# Patient Record
Sex: FEMALE | Race: WHITE | Employment: OTHER | URBAN - METROPOLITAN AREA
[De-identification: names, ages, dates, MRNs, and addresses within clinical notes are randomized per-mention and may not be internally consistent; named-entity substitution may affect disease eponyms.]

---

## 2018-03-05 PROBLEM — M51.379 DEGENERATION OF LUMBAR OR LUMBOSACRAL INTERVERTEBRAL DISC: Chronic | Status: ACTIVE | Noted: 2018-03-05

## 2018-03-05 PROBLEM — M47.817 LUMBOSACRAL SPONDYLOSIS WITHOUT MYELOPATHY: Status: ACTIVE | Noted: 2018-03-05

## 2018-03-05 PROBLEM — M51.26 DISPLACEMENT OF LUMBAR INTERVERTEBRAL DISC WITHOUT MYELOPATHY: Chronic | Status: ACTIVE | Noted: 2018-03-05

## 2018-03-05 PROBLEM — M51.26 DISPLACEMENT OF LUMBAR INTERVERTEBRAL DISC WITHOUT MYELOPATHY: Status: ACTIVE | Noted: 2018-03-05

## 2018-03-05 PROBLEM — M51.37 DEGENERATION OF LUMBAR OR LUMBOSACRAL INTERVERTEBRAL DISC: Status: ACTIVE | Noted: 2018-03-05

## 2018-03-05 PROBLEM — M47.817 LUMBOSACRAL SPONDYLOSIS WITHOUT MYELOPATHY: Chronic | Status: ACTIVE | Noted: 2018-03-05

## 2018-03-05 PROBLEM — M51.379 DEGENERATION OF LUMBAR OR LUMBOSACRAL INTERVERTEBRAL DISC: Status: ACTIVE | Noted: 2018-03-05

## 2018-03-05 PROBLEM — M51.37 DEGENERATION OF LUMBAR OR LUMBOSACRAL INTERVERTEBRAL DISC: Chronic | Status: ACTIVE | Noted: 2018-03-05

## 2018-03-05 PROBLEM — M48.061 SPINAL STENOSIS, LUMBAR REGION, WITHOUT NEUROGENIC CLAUDICATION: Status: ACTIVE | Noted: 2018-03-05

## 2018-03-05 PROBLEM — M48.061 SPINAL STENOSIS, LUMBAR REGION, WITHOUT NEUROGENIC CLAUDICATION: Chronic | Status: ACTIVE | Noted: 2018-03-05

## 2018-05-24 PROBLEM — M48.061 SPINAL STENOSIS OF LUMBAR REGION: Status: ACTIVE | Noted: 2018-05-24

## 2018-05-24 PROBLEM — M51.26 DISC DISPLACEMENT, LUMBAR: Status: ACTIVE | Noted: 2018-05-24

## 2018-05-24 PROBLEM — M47.816 LUMBAR FACET ARTHROPATHY: Status: ACTIVE | Noted: 2018-05-24

## 2020-02-14 ENCOUNTER — HOSPITAL ENCOUNTER (OUTPATIENT)
Dept: CARDIAC REHAB | Age: 80
Setting detail: THERAPIES SERIES
Discharge: HOME | End: 2020-02-14
Payer: MEDICARE

## 2020-02-28 ENCOUNTER — HOSPITAL ENCOUNTER (OUTPATIENT)
Dept: CARDIAC REHAB | Age: 80
Setting detail: THERAPIES SERIES
Discharge: HOME | End: 2020-02-28
Payer: MEDICARE

## 2020-02-28 PROCEDURE — G0239 OTH RESP PROC, GROUP: HCPCS

## 2020-03-02 ENCOUNTER — HOSPITAL ENCOUNTER (OUTPATIENT)
Dept: CARDIAC REHAB | Age: 80
Setting detail: THERAPIES SERIES
Discharge: HOME | End: 2020-03-02
Payer: MEDICARE

## 2020-03-02 PROCEDURE — G0239 OTH RESP PROC, GROUP: HCPCS

## 2020-03-04 ENCOUNTER — HOSPITAL ENCOUNTER (OUTPATIENT)
Dept: CARDIAC REHAB | Age: 80
Setting detail: THERAPIES SERIES
Discharge: HOME | End: 2020-03-04
Payer: MEDICARE

## 2020-03-04 PROCEDURE — G0239 OTH RESP PROC, GROUP: HCPCS

## 2020-03-09 ENCOUNTER — HOSPITAL ENCOUNTER (OUTPATIENT)
Dept: CARDIAC REHAB | Age: 80
Setting detail: THERAPIES SERIES
Discharge: HOME | End: 2020-03-09
Payer: MEDICARE

## 2020-03-09 PROCEDURE — G0239 OTH RESP PROC, GROUP: HCPCS

## 2020-03-11 ENCOUNTER — HOSPITAL ENCOUNTER (OUTPATIENT)
Dept: CARDIAC REHAB | Age: 80
Setting detail: THERAPIES SERIES
Discharge: HOME | End: 2020-03-11
Payer: MEDICARE

## 2020-03-11 PROCEDURE — G0239 OTH RESP PROC, GROUP: HCPCS

## 2020-03-13 ENCOUNTER — HOSPITAL ENCOUNTER (OUTPATIENT)
Dept: CARDIAC REHAB | Age: 80
Setting detail: THERAPIES SERIES
Discharge: HOME | End: 2020-03-13
Payer: MEDICARE

## 2020-03-13 PROCEDURE — G0239 OTH RESP PROC, GROUP: HCPCS

## 2020-03-16 ENCOUNTER — APPOINTMENT (OUTPATIENT)
Dept: CARDIAC REHAB | Age: 80
End: 2020-03-16
Payer: MEDICARE

## 2020-03-18 ENCOUNTER — APPOINTMENT (OUTPATIENT)
Dept: CARDIAC REHAB | Age: 80
End: 2020-03-18
Payer: MEDICARE

## 2020-03-20 ENCOUNTER — APPOINTMENT (OUTPATIENT)
Dept: CARDIAC REHAB | Age: 80
End: 2020-03-20
Payer: MEDICARE

## 2020-03-23 ENCOUNTER — APPOINTMENT (OUTPATIENT)
Dept: CARDIAC REHAB | Age: 80
End: 2020-03-23
Payer: MEDICARE

## 2020-03-25 ENCOUNTER — APPOINTMENT (OUTPATIENT)
Dept: CARDIAC REHAB | Age: 80
End: 2020-03-25
Payer: MEDICARE

## 2020-03-27 ENCOUNTER — APPOINTMENT (OUTPATIENT)
Dept: CARDIAC REHAB | Age: 80
End: 2020-03-27
Payer: MEDICARE

## 2020-03-30 ENCOUNTER — APPOINTMENT (OUTPATIENT)
Dept: CARDIAC REHAB | Age: 80
End: 2020-03-30
Payer: MEDICARE

## 2020-06-29 ENCOUNTER — HOSPITAL ENCOUNTER (OUTPATIENT)
Dept: CARDIAC REHAB | Age: 80
Setting detail: THERAPIES SERIES
Discharge: HOME | End: 2020-06-29
Payer: MEDICARE

## 2020-06-29 PROCEDURE — G0239 OTH RESP PROC, GROUP: HCPCS

## 2020-07-01 ENCOUNTER — HOSPITAL ENCOUNTER (OUTPATIENT)
Dept: CARDIAC REHAB | Age: 80
Setting detail: THERAPIES SERIES
Discharge: HOME | End: 2020-07-01
Payer: MEDICARE

## 2020-07-01 PROCEDURE — G0239 OTH RESP PROC, GROUP: HCPCS

## 2020-07-06 ENCOUNTER — APPOINTMENT (OUTPATIENT)
Dept: CARDIAC REHAB | Age: 80
End: 2020-07-06
Payer: MEDICARE

## 2020-07-08 ENCOUNTER — APPOINTMENT (OUTPATIENT)
Dept: CARDIAC REHAB | Age: 80
End: 2020-07-08
Payer: MEDICARE

## 2020-07-10 ENCOUNTER — APPOINTMENT (OUTPATIENT)
Dept: CARDIAC REHAB | Age: 80
End: 2020-07-10
Payer: MEDICARE

## 2020-07-13 ENCOUNTER — APPOINTMENT (OUTPATIENT)
Dept: CARDIAC REHAB | Age: 80
End: 2020-07-13
Payer: MEDICARE

## 2020-07-15 ENCOUNTER — APPOINTMENT (OUTPATIENT)
Dept: CARDIAC REHAB | Age: 80
End: 2020-07-15
Payer: MEDICARE

## 2020-07-17 ENCOUNTER — APPOINTMENT (OUTPATIENT)
Dept: CARDIAC REHAB | Age: 80
End: 2020-07-17
Payer: MEDICARE

## 2020-07-20 ENCOUNTER — APPOINTMENT (OUTPATIENT)
Dept: CARDIAC REHAB | Age: 80
End: 2020-07-20
Payer: MEDICARE

## 2020-07-22 ENCOUNTER — APPOINTMENT (OUTPATIENT)
Dept: CARDIAC REHAB | Age: 80
End: 2020-07-22
Payer: MEDICARE

## 2020-07-24 ENCOUNTER — APPOINTMENT (OUTPATIENT)
Dept: CARDIAC REHAB | Age: 80
End: 2020-07-24
Payer: MEDICARE

## 2020-07-27 ENCOUNTER — APPOINTMENT (OUTPATIENT)
Dept: CARDIAC REHAB | Age: 80
End: 2020-07-27
Payer: MEDICARE

## 2020-07-29 ENCOUNTER — APPOINTMENT (OUTPATIENT)
Dept: CARDIAC REHAB | Age: 80
End: 2020-07-29
Payer: MEDICARE

## 2020-07-31 ENCOUNTER — APPOINTMENT (OUTPATIENT)
Dept: CARDIAC REHAB | Age: 80
End: 2020-07-31
Payer: MEDICARE

## 2023-03-12 ENCOUNTER — APPOINTMENT (OUTPATIENT)
Dept: GENERAL RADIOLOGY | Age: 83
DRG: 176 | End: 2023-03-12
Payer: MEDICARE

## 2023-03-12 ENCOUNTER — APPOINTMENT (OUTPATIENT)
Dept: CT IMAGING | Age: 83
DRG: 176 | End: 2023-03-12
Payer: MEDICARE

## 2023-03-12 ENCOUNTER — HOSPITAL ENCOUNTER (INPATIENT)
Age: 83
LOS: 3 days | Discharge: HOME | DRG: 176 | End: 2023-03-15
Attending: STUDENT IN AN ORGANIZED HEALTH CARE EDUCATION/TRAINING PROGRAM | Admitting: HOSPITALIST
Payer: MEDICARE

## 2023-03-12 DIAGNOSIS — I26.99 ACUTE PULMONARY EMBOLISM WITHOUT ACUTE COR PULMONALE, UNSPECIFIED PULMONARY EMBOLISM TYPE (HCC): Primary | ICD-10-CM

## 2023-03-12 DIAGNOSIS — R06.02 SHORTNESS OF BREATH: ICD-10-CM

## 2023-03-12 PROBLEM — J84.112 IPF (IDIOPATHIC PULMONARY FIBROSIS) (HCC): Status: ACTIVE | Noted: 2023-03-12

## 2023-03-12 PROBLEM — Z78.9 MODERATE ALCOHOL CONSUMPTION: Status: ACTIVE | Noted: 2023-03-12

## 2023-03-12 PROBLEM — J18.9 BILATERAL PNEUMONIA: Status: ACTIVE | Noted: 2023-03-12

## 2023-03-12 PROBLEM — J47.0 BRONCHIECTASIS WITH ACUTE LOWER RESPIRATORY INFECTION (HCC): Status: ACTIVE | Noted: 2023-03-12

## 2023-03-12 LAB
A/G RATIO: 1.5 (ref 1.1–2.2)
ALBUMIN SERPL-MCNC: 4.6 G/DL (ref 3.4–5)
ALP BLD-CCNC: 92 U/L (ref 40–129)
ALT SERPL-CCNC: 20 U/L (ref 10–40)
ANION GAP SERPL CALCULATED.3IONS-SCNC: 11 MMOL/L (ref 3–16)
ANTI-XA UNFRAC HEPARIN: 0.72 IU/ML (ref 0.3–0.7)
APTT: 118.3 SEC (ref 23–34.3)
APTT: 30 SEC (ref 23–34.3)
AST SERPL-CCNC: 25 U/L (ref 15–37)
BACTERIA: ABNORMAL /HPF
BASOPHILS ABSOLUTE: 0.1 K/UL (ref 0–0.2)
BASOPHILS RELATIVE PERCENT: 0.9 %
BILIRUB SERPL-MCNC: 0.4 MG/DL (ref 0–1)
BILIRUBIN URINE: NEGATIVE
BLOOD, URINE: ABNORMAL
BUN BLDV-MCNC: 12 MG/DL (ref 7–20)
CALCIUM SERPL-MCNC: 11.2 MG/DL (ref 8.3–10.6)
CHLORIDE BLD-SCNC: 98 MMOL/L (ref 99–110)
CLARITY: CLEAR
CO2: 25 MMOL/L (ref 21–32)
COLOR: YELLOW
CREAT SERPL-MCNC: 0.8 MG/DL (ref 0.6–1.2)
EKG ATRIAL RATE: 77 BPM
EKG DIAGNOSIS: NORMAL
EKG P AXIS: 15 DEGREES
EKG P-R INTERVAL: 144 MS
EKG Q-T INTERVAL: 348 MS
EKG QRS DURATION: 68 MS
EKG QTC CALCULATION (BAZETT): 393 MS
EKG R AXIS: -15 DEGREES
EKG T AXIS: 20 DEGREES
EKG VENTRICULAR RATE: 77 BPM
EOSINOPHILS ABSOLUTE: 0.6 K/UL (ref 0–0.6)
EOSINOPHILS RELATIVE PERCENT: 6.2 %
EPITHELIAL CELLS, UA: ABNORMAL /HPF (ref 0–5)
GFR SERPL CREATININE-BSD FRML MDRD: >60 ML/MIN/{1.73_M2}
GLUCOSE BLD-MCNC: 93 MG/DL (ref 70–99)
GLUCOSE URINE: NEGATIVE MG/DL
HCT VFR BLD CALC: 44.1 % (ref 36–48)
HCT VFR BLD CALC: 46.5 % (ref 36–48)
HEMOGLOBIN: 14.5 G/DL (ref 12–16)
HEMOGLOBIN: 15.1 G/DL (ref 12–16)
INR BLD: 1.13 (ref 0.87–1.14)
KETONES, URINE: NEGATIVE MG/DL
LACTIC ACID, SEPSIS: 2 MMOL/L (ref 0.4–1.9)
LACTIC ACID, SEPSIS: 2.4 MMOL/L (ref 0.4–1.9)
LEUKOCYTE ESTERASE, URINE: ABNORMAL
LYMPHOCYTES ABSOLUTE: 2.6 K/UL (ref 1–5.1)
LYMPHOCYTES RELATIVE PERCENT: 26.8 %
MCH RBC QN AUTO: 31.1 PG (ref 26–34)
MCH RBC QN AUTO: 31.2 PG (ref 26–34)
MCHC RBC AUTO-ENTMCNC: 32.6 G/DL (ref 31–36)
MCHC RBC AUTO-ENTMCNC: 32.9 G/DL (ref 31–36)
MCV RBC AUTO: 94.6 FL (ref 80–100)
MCV RBC AUTO: 95.6 FL (ref 80–100)
MICROSCOPIC EXAMINATION: YES
MONOCYTES ABSOLUTE: 0.9 K/UL (ref 0–1.3)
MONOCYTES RELATIVE PERCENT: 9.5 %
NEUTROPHILS ABSOLUTE: 5.4 K/UL (ref 1.7–7.7)
NEUTROPHILS RELATIVE PERCENT: 56.6 %
NITRITE, URINE: NEGATIVE
PDW BLD-RTO: 13.9 % (ref 12.4–15.4)
PDW BLD-RTO: 14.5 % (ref 12.4–15.4)
PH UA: 6.5 (ref 5–8)
PLATELET # BLD: 322 K/UL (ref 135–450)
PLATELET # BLD: 362 K/UL (ref 135–450)
PMV BLD AUTO: 7.4 FL (ref 5–10.5)
PMV BLD AUTO: 7.8 FL (ref 5–10.5)
POTASSIUM REFLEX MAGNESIUM: 4.3 MMOL/L (ref 3.5–5.1)
PRO-BNP: 71 PG/ML (ref 0–449)
PROCALCITONIN: 0.02 NG/ML (ref 0–0.15)
PROTEIN UA: NEGATIVE MG/DL
PROTHROMBIN TIME: 14.4 SEC (ref 11.7–14.5)
RBC # BLD: 4.66 M/UL (ref 4–5.2)
RBC # BLD: 4.87 M/UL (ref 4–5.2)
RBC UA: ABNORMAL /HPF (ref 0–4)
SODIUM BLD-SCNC: 134 MMOL/L (ref 136–145)
SPECIFIC GRAVITY UA: 1.01 (ref 1–1.03)
TOTAL PROTEIN: 7.6 G/DL (ref 6.4–8.2)
TROPONIN: <0.01 NG/ML
URINE REFLEX TO CULTURE: YES
URINE TYPE: ABNORMAL
UROBILINOGEN, URINE: 0.2 E.U./DL
WBC # BLD: 10.7 K/UL (ref 4–11)
WBC # BLD: 9.5 K/UL (ref 4–11)
WBC UA: ABNORMAL /HPF (ref 0–5)

## 2023-03-12 PROCEDURE — 87086 URINE CULTURE/COLONY COUNT: CPT

## 2023-03-12 PROCEDURE — 6370000000 HC RX 637 (ALT 250 FOR IP): Performed by: HOSPITALIST

## 2023-03-12 PROCEDURE — 96365 THER/PROPH/DIAG IV INF INIT: CPT

## 2023-03-12 PROCEDURE — 85730 THROMBOPLASTIN TIME PARTIAL: CPT

## 2023-03-12 PROCEDURE — 87077 CULTURE AEROBIC IDENTIFY: CPT

## 2023-03-12 PROCEDURE — 1200000000 HC SEMI PRIVATE

## 2023-03-12 PROCEDURE — 84145 PROCALCITONIN (PCT): CPT

## 2023-03-12 PROCEDURE — 87040 BLOOD CULTURE FOR BACTERIA: CPT

## 2023-03-12 PROCEDURE — 81001 URINALYSIS AUTO W/SCOPE: CPT

## 2023-03-12 PROCEDURE — 71260 CT THORAX DX C+: CPT | Performed by: PHYSICIAN ASSISTANT

## 2023-03-12 PROCEDURE — 93005 ELECTROCARDIOGRAM TRACING: CPT | Performed by: PHYSICIAN ASSISTANT

## 2023-03-12 PROCEDURE — 6360000002 HC RX W HCPCS: Performed by: PHYSICIAN ASSISTANT

## 2023-03-12 PROCEDURE — 85610 PROTHROMBIN TIME: CPT

## 2023-03-12 PROCEDURE — 99285 EMERGENCY DEPT VISIT HI MDM: CPT

## 2023-03-12 PROCEDURE — 6360000004 HC RX CONTRAST MEDICATION: Performed by: PHYSICIAN ASSISTANT

## 2023-03-12 PROCEDURE — 36415 COLL VENOUS BLD VENIPUNCTURE: CPT

## 2023-03-12 PROCEDURE — 96375 TX/PRO/DX INJ NEW DRUG ADDON: CPT

## 2023-03-12 PROCEDURE — 87186 SC STD MICRODIL/AGAR DIL: CPT

## 2023-03-12 PROCEDURE — 6370000000 HC RX 637 (ALT 250 FOR IP): Performed by: PHYSICIAN ASSISTANT

## 2023-03-12 PROCEDURE — 94640 AIRWAY INHALATION TREATMENT: CPT

## 2023-03-12 PROCEDURE — 71045 X-RAY EXAM CHEST 1 VIEW: CPT

## 2023-03-12 PROCEDURE — 83605 ASSAY OF LACTIC ACID: CPT

## 2023-03-12 PROCEDURE — 85027 COMPLETE CBC AUTOMATED: CPT

## 2023-03-12 PROCEDURE — 2580000003 HC RX 258: Performed by: HOSPITALIST

## 2023-03-12 PROCEDURE — 2580000003 HC RX 258: Performed by: PHYSICIAN ASSISTANT

## 2023-03-12 PROCEDURE — 6360000002 HC RX W HCPCS: Performed by: HOSPITALIST

## 2023-03-12 PROCEDURE — 85025 COMPLETE CBC W/AUTO DIFF WBC: CPT

## 2023-03-12 PROCEDURE — 84484 ASSAY OF TROPONIN QUANT: CPT

## 2023-03-12 PROCEDURE — 85520 HEPARIN ASSAY: CPT

## 2023-03-12 PROCEDURE — 80053 COMPREHEN METABOLIC PANEL: CPT

## 2023-03-12 PROCEDURE — 83880 ASSAY OF NATRIURETIC PEPTIDE: CPT

## 2023-03-12 PROCEDURE — 93010 ELECTROCARDIOGRAM REPORT: CPT | Performed by: INTERNAL MEDICINE

## 2023-03-12 RX ORDER — HEPARIN SODIUM 1000 [USP'U]/ML
5000 INJECTION, SOLUTION INTRAVENOUS; SUBCUTANEOUS PRN
Status: DISCONTINUED | OUTPATIENT
Start: 2023-03-12 | End: 2023-03-15 | Stop reason: HOSPADM

## 2023-03-12 RX ORDER — SODIUM CHLORIDE 0.9 % (FLUSH) 0.9 %
5-40 SYRINGE (ML) INJECTION PRN
Status: DISCONTINUED | OUTPATIENT
Start: 2023-03-12 | End: 2023-03-15 | Stop reason: HOSPADM

## 2023-03-12 RX ORDER — HEPARIN SODIUM 10000 [USP'U]/100ML
1070 INJECTION, SOLUTION INTRAVENOUS CONTINUOUS
Status: DISCONTINUED | OUTPATIENT
Start: 2023-03-12 | End: 2023-03-15

## 2023-03-12 RX ORDER — ACETAMINOPHEN 650 MG/1
650 SUPPOSITORY RECTAL EVERY 6 HOURS PRN
Status: DISCONTINUED | OUTPATIENT
Start: 2023-03-12 | End: 2023-03-15 | Stop reason: HOSPADM

## 2023-03-12 RX ORDER — SODIUM CHLORIDE 0.9 % (FLUSH) 0.9 %
5-40 SYRINGE (ML) INJECTION EVERY 12 HOURS SCHEDULED
Status: DISCONTINUED | OUTPATIENT
Start: 2023-03-12 | End: 2023-03-15 | Stop reason: HOSPADM

## 2023-03-12 RX ORDER — POTASSIUM CHLORIDE 7.45 MG/ML
10 INJECTION INTRAVENOUS PRN
Status: DISCONTINUED | OUTPATIENT
Start: 2023-03-12 | End: 2023-03-15 | Stop reason: HOSPADM

## 2023-03-12 RX ORDER — FUROSEMIDE 20 MG/1
20 TABLET ORAL DAILY
Status: DISCONTINUED | OUTPATIENT
Start: 2023-03-12 | End: 2023-03-14

## 2023-03-12 RX ORDER — CARBOXYMETHYLCELLULOSE SODIUM 10 MG/ML
2 GEL OPHTHALMIC AS NEEDED
Status: DISCONTINUED | OUTPATIENT
Start: 2023-03-12 | End: 2023-03-15 | Stop reason: HOSPADM

## 2023-03-12 RX ORDER — ONDANSETRON 4 MG/1
4 TABLET, ORALLY DISINTEGRATING ORAL EVERY 8 HOURS PRN
Status: DISCONTINUED | OUTPATIENT
Start: 2023-03-12 | End: 2023-03-15 | Stop reason: HOSPADM

## 2023-03-12 RX ORDER — POLYETHYLENE GLYCOL 3350 17 G/17G
17 POWDER, FOR SOLUTION ORAL DAILY PRN
Status: DISCONTINUED | OUTPATIENT
Start: 2023-03-12 | End: 2023-03-15 | Stop reason: HOSPADM

## 2023-03-12 RX ORDER — LEVOTHYROXINE SODIUM 0.05 MG/1
50 TABLET ORAL DAILY
Status: DISCONTINUED | OUTPATIENT
Start: 2023-03-13 | End: 2023-03-15 | Stop reason: HOSPADM

## 2023-03-12 RX ORDER — HEPARIN SODIUM 1000 [USP'U]/ML
2500 INJECTION, SOLUTION INTRAVENOUS; SUBCUTANEOUS PRN
Status: DISCONTINUED | OUTPATIENT
Start: 2023-03-12 | End: 2023-03-15 | Stop reason: HOSPADM

## 2023-03-12 RX ORDER — 0.9 % SODIUM CHLORIDE 0.9 %
1000 INTRAVENOUS SOLUTION INTRAVENOUS ONCE
Status: COMPLETED | OUTPATIENT
Start: 2023-03-12 | End: 2023-03-12

## 2023-03-12 RX ORDER — ACETAMINOPHEN 325 MG/1
650 TABLET ORAL EVERY 6 HOURS PRN
Status: DISCONTINUED | OUTPATIENT
Start: 2023-03-12 | End: 2023-03-15 | Stop reason: HOSPADM

## 2023-03-12 RX ORDER — MAGNESIUM SULFATE IN WATER 40 MG/ML
2000 INJECTION, SOLUTION INTRAVENOUS PRN
Status: DISCONTINUED | OUTPATIENT
Start: 2023-03-12 | End: 2023-03-15 | Stop reason: HOSPADM

## 2023-03-12 RX ORDER — HEPARIN SODIUM 1000 [USP'U]/ML
3800 INJECTION, SOLUTION INTRAVENOUS; SUBCUTANEOUS ONCE
Status: DISCONTINUED | OUTPATIENT
Start: 2023-03-12 | End: 2023-03-12

## 2023-03-12 RX ORDER — HEPARIN SODIUM 1000 [USP'U]/ML
3800 INJECTION, SOLUTION INTRAVENOUS; SUBCUTANEOUS PRN
Status: DISCONTINUED | OUTPATIENT
Start: 2023-03-12 | End: 2023-03-12

## 2023-03-12 RX ORDER — HEPARIN SODIUM 1000 [USP'U]/ML
1900 INJECTION, SOLUTION INTRAVENOUS; SUBCUTANEOUS PRN
Status: DISCONTINUED | OUTPATIENT
Start: 2023-03-12 | End: 2023-03-12

## 2023-03-12 RX ORDER — HEPARIN SODIUM 1000 [USP'U]/ML
2500 INJECTION, SOLUTION INTRAVENOUS; SUBCUTANEOUS PRN
Status: DISCONTINUED | OUTPATIENT
Start: 2023-03-12 | End: 2023-03-12

## 2023-03-12 RX ORDER — IPRATROPIUM BROMIDE AND ALBUTEROL SULFATE 2.5; .5 MG/3ML; MG/3ML
1 SOLUTION RESPIRATORY (INHALATION)
Status: COMPLETED | OUTPATIENT
Start: 2023-03-12 | End: 2023-03-12

## 2023-03-12 RX ORDER — HEPARIN SODIUM 1000 [USP'U]/ML
5000 INJECTION, SOLUTION INTRAVENOUS; SUBCUTANEOUS ONCE
Status: COMPLETED | OUTPATIENT
Start: 2023-03-12 | End: 2023-03-12

## 2023-03-12 RX ORDER — METHYLPREDNISOLONE SODIUM SUCCINATE 125 MG/2ML
125 INJECTION, POWDER, LYOPHILIZED, FOR SOLUTION INTRAMUSCULAR; INTRAVENOUS ONCE
Status: COMPLETED | OUTPATIENT
Start: 2023-03-12 | End: 2023-03-12

## 2023-03-12 RX ORDER — SODIUM CHLORIDE 9 MG/ML
INJECTION, SOLUTION INTRAVENOUS PRN
Status: DISCONTINUED | OUTPATIENT
Start: 2023-03-12 | End: 2023-03-15 | Stop reason: HOSPADM

## 2023-03-12 RX ORDER — IPRATROPIUM BROMIDE AND ALBUTEROL SULFATE 2.5; .5 MG/3ML; MG/3ML
1 SOLUTION RESPIRATORY (INHALATION)
Status: DISCONTINUED | OUTPATIENT
Start: 2023-03-12 | End: 2023-03-14

## 2023-03-12 RX ORDER — POTASSIUM CHLORIDE 20 MEQ/1
40 TABLET, EXTENDED RELEASE ORAL PRN
Status: DISCONTINUED | OUTPATIENT
Start: 2023-03-12 | End: 2023-03-15 | Stop reason: HOSPADM

## 2023-03-12 RX ORDER — LEVOFLOXACIN 5 MG/ML
750 INJECTION, SOLUTION INTRAVENOUS EVERY 24 HOURS
Status: DISCONTINUED | OUTPATIENT
Start: 2023-03-13 | End: 2023-03-13

## 2023-03-12 RX ORDER — ONDANSETRON 2 MG/ML
4 INJECTION INTRAMUSCULAR; INTRAVENOUS EVERY 6 HOURS PRN
Status: DISCONTINUED | OUTPATIENT
Start: 2023-03-12 | End: 2023-03-15 | Stop reason: HOSPADM

## 2023-03-12 RX ORDER — PANTOPRAZOLE SODIUM 40 MG/1
40 TABLET, DELAYED RELEASE ORAL
Status: DISCONTINUED | OUTPATIENT
Start: 2023-03-13 | End: 2023-03-15 | Stop reason: HOSPADM

## 2023-03-12 RX ORDER — HEPARIN SODIUM 1000 [USP'U]/ML
5000 INJECTION, SOLUTION INTRAVENOUS; SUBCUTANEOUS PRN
Status: DISCONTINUED | OUTPATIENT
Start: 2023-03-12 | End: 2023-03-12

## 2023-03-12 RX ADMIN — HEPARIN SODIUM 5000 UNITS: 1000 INJECTION INTRAVENOUS; SUBCUTANEOUS at 16:52

## 2023-03-12 RX ADMIN — METHYLPREDNISOLONE SODIUM SUCCINATE 125 MG: 125 INJECTION, POWDER, FOR SOLUTION INTRAMUSCULAR; INTRAVENOUS at 13:22

## 2023-03-12 RX ADMIN — CEFTRIAXONE SODIUM 1000 MG: 1 INJECTION, POWDER, FOR SOLUTION INTRAMUSCULAR; INTRAVENOUS at 15:37

## 2023-03-12 RX ADMIN — IPRATROPIUM BROMIDE AND ALBUTEROL SULFATE 1 AMPULE: 2.5; .5 SOLUTION RESPIRATORY (INHALATION) at 13:17

## 2023-03-12 RX ADMIN — SODIUM CHLORIDE 1000 ML: 9 INJECTION, SOLUTION INTRAVENOUS at 14:16

## 2023-03-12 RX ADMIN — PIPERACILLIN AND TAZOBACTAM 3375 MG: 3; .375 INJECTION, POWDER, FOR SOLUTION INTRAVENOUS at 23:05

## 2023-03-12 RX ADMIN — FUROSEMIDE 20 MG: 20 TABLET ORAL at 19:40

## 2023-03-12 RX ADMIN — IPRATROPIUM BROMIDE AND ALBUTEROL SULFATE 1 AMPULE: 2.5; .5 SOLUTION RESPIRATORY (INHALATION) at 19:54

## 2023-03-12 RX ADMIN — HEPARIN SODIUM 1130 UNITS/HR: 10000 INJECTION, SOLUTION INTRAVENOUS at 16:56

## 2023-03-12 RX ADMIN — IOPAMIDOL 75 ML: 755 INJECTION, SOLUTION INTRAVENOUS at 15:28

## 2023-03-12 ASSESSMENT — PAIN SCALES - GENERAL
PAINLEVEL_OUTOF10: 0

## 2023-03-12 NOTE — CONSULTS
Pharmacy to Manage Heparin Infusion per [facility]    Dx: PE  Pt wt =  84.8kg___ (will use adjusted wt if actual body weight > 120% ideal body weight). Oral factor Xa-inhibitors may alter and elevate anti-Xa levels used for unfractionated heparin monitoring. As a result, anti-Xa monitoring is not accurate while Xa-inhibitor activity is detectable. Utilize aPTT monitoring when patient received an oral factor Xa-inhibitor (apixaban, betrixaban, edoxaban or rivaroxaban) within 72 hours prior to admission (please document last administration time). The goal is to allow a washout of oral factor Xa-inhibitors by using aPTT for 72 hours, then change to ant-Xa levels for UFH. Heparin (weight-based) Infusion: VTE/DVT/PE  Heparin 80 units/kg IVP bolus (max 10,000 units) followed by Heparin infusion at 18 units/kg/hr (recommended max initial rate: 2100 units/hr). Recheck anti-Xa (unless aPTT being used) in 6 hours. Goal anti-Xa 0.3-0.7 IU/mL  Goal aPTT =  seconds.   [address]  3/12/2023 at 4:14 PM    -----------------------------------  3/12 2259  High-Dose Heparin Drip  Current Rate: 1130 units/hr  3/12 @ 2237 Anti-Xa Level= 0.72  Plan: Per pharmacy dosing, we will decrease heparin drip by 60 units/hr, making new drip rate 1070 units/hr    Next Anti-Xa Level: 3/13 @ Sarah Bartholomew 3/12/2023 10:57 PM    -----------------------------  3/13 0704  High-Dose Heparin Drip  Current Rate: 1070 units/hr  3/13 @ 3346 Anti-Xa Level= 0.52  Plan: Per pharmacy dosing, we will not make any changes at this time    Next Anti-Xa Level: 3/13 @ [address], PharmD 3/13/2023 7:03 AM    3/13/23  Anti-Xa=0.34 at12:21  No changes needed  Continue infusion at current rate of 1070 units/hr per  Protocol  Will check anti-Xa daily starting 3/14am.  Adrianna Montana/Gui. 3/13/23 2:45 PM EDT    3/14/23  - Anit-Xa= 0.34  - No changes needed, continue heparin infusion at current rate of 1070 units/hr per protocol  - will continue with daily Anti-Xa check.   Adrianna Montana/[facility]. 3/14/23 11:54 AM EDT    ------------------------------  3/15 3782  High-Dose Heparin Drip  Current Rate: 1070 units/hr  3/15 @ 0602 Anti-Xa Level= 0.49  Plan: Per pharmacy dosing, we will not make any changes at this time    Next Anti-Xa Level: 3/16 @ 0600  Claude Bowling, PharmD 3/15/2023 6:48 AM

## 2023-03-12 NOTE — ED PROVIDER NOTES
This patient was seen and evaluated by the KAROLINA. I did discuss the case in detail with the KAROLINA and agreed with admission, however I did not physically see this patient myself. ECG    The Ekg interpreted by me shows sinus rhythm with PACs and a rate of 77 bpm.  Left axis deviation. No acute injury pattern. , QRS 68, QTc 393.      Temi Shell, DO  03/12/23 18 Holmen Cristina, DO  03/13/23 0002

## 2023-03-12 NOTE — H&P
HOSPITALISTS HISTORY AND PHYSICAL    3/13/2023 5:44 AM    Patient Information:  Ivet Damico is a 80 y.o. female [id number]  PCP:  NICKO Pugh CNP (Tel: [phone] )    Chief complaint:    Chief Complaint   Patient presents with    Cough     Hx of pulmonary fibrosis. Cough for 1 month. 02 sat low 90% recently        History of Present Illness: Jerardo Oquendo is a 80 y.o. female who presented to the ED to be evaluated for increased dyspnea and cough in the setting of known idiopathic pulmonary fibrosis (IPF). She reports that more recently she has noted her home pulse oximetry reading as low as 90%, which is atypical for her because she is not oxygen dependent at baseline. Upon further questioning patient does endorse intermittent chest pain, pleuritic in nature. She denies unilateral leg edema or pain, but does note BLE swelling chronically. Patient has no previous history of blood clots, no known hypercoagulability, and is not on chronic anticoagulation. Upon her to the ED EKG was obtained revealing NSR with PVCs and low voltage QRS. Chest CT with PE protocol demonstrates acute PE in anterior RUL, as well as age-indeterminate emboli in RLL. Patient also with scattered peripheral groundglass infiltrates bilaterally suggestive of infection with atypical bacteria. Notable labs include: Lactate 2.4 and hypercalcemia 11.2. Following collection of blood cultures patient received a dosage of IV Rocephin and Zithromax. She also was treated with IV Solu-Medrol and DuoNebs due to bronchospasm upon arrival.  Finally, she was placed on high-dose weight-based heparin drip to treat acute PE, prior to rest for admission.       History obtained from patient and review of Epic chart     REVIEW OF SYSTEMS:   Constitutional: Positive for fever,chills, and generalized weakness  ENT: Negative for headache, rhinorrhea, and sore throat. Respiratory: Acute on chronic dyspnea with wheezing, and productive cough  Cardiovascular: Atypical pleuritic chest pain; intermittent palpitations, peripheral edema, orthopnea or PND  Gastrointestinal: Negative for N/V/D and abdominal pain; no hematemesis, hematochezia, or melena; no anorexia  Genitourinary: Negative for dysuria, frequency, retention; no incontinence  Hematologic/Lymphatic: Negative for bleeding tendency/excessive bruising  Musculoskeletal: Positive myalgias and arthalgias; able to ambulate without difficulty  Neurologic: Negative for LOC, seizure activity, paresthesias, dysarthria, vertigo, and gait disturbance  Skin: Negative for itching,rash, decubitus  Psychiatric: Negative for depression,anxiety, and agitation; no hallucinations; denies SI/HI  Endocrine: Negative for polyuria/polydipsia/polyphagia; no heat/cold intolerance    Past Medical History:   has a past medical history of Allergic rhinitis, mild, Arthritis, Asthma, Bloating, [facility] Swallowing difficulty, and Thyroid disease. Past Surgical History:   has a past surgical history that includes Cholecystectomy; Hysterectomy; Tubal ligation; [place] tooth extraction; Upper gastrointestinal endoscopy (5/22/12); and Colonoscopy (7/24/14). Medications:  No current facility-administered medications on file prior to encounter. Current Outpatient Medications on File Prior to Encounter   Medication Sig Dispense Refill    furosemide (LASIX) 20 MG tablet Take 20 mg by mouth daily. solifenacin (VESICARE) 10 MG tablet Take 10 mg by mouth daily. nitrofurantoin (MACRODANTIN) 50 MG capsule Take 50 mg by mouth nightly. FIBER PO Take  by mouth. Probiotic Product (PROBIOTIC DAILY PO) Take  by mouth. celecoxib (CELEBREX) 200 MG capsule Take 200 mg by mouth 2 times daily. Naphazoline-Pheniramine (EYE ALLERGY RELIEF OP) Apply  to eye.       omeprazole (PRILOSEC) 20 MG capsule Take 2 capsules by mouth 2 times daily. 30 capsule 3    Fluticasone-Salmeterol (ADVAIR HFA IN) Inhale  into the lungs. levothyroxine (SYNTHROID) 50 MCG tablet Take 50 mcg by mouth daily. Carboxymethylcellulose Sodium (REFRESH OP) Apply  to eye. Allergies:  No Known Allergies     Social History:   reports that she quit smoking about 54 years ago. She has never used smokeless tobacco. She reports current alcohol use. She reports that she does not use drugs. Family History:  family history includes Heart Disease in her father and mother.      Physical Exam:  BP (!) 146/79   Pulse 92   Temp 97.6 °F (36.4 °C) (Oral)   Resp 16   Ht 5' 1\" (1.549 m)   Wt 189 lb 4.8 oz (85.9 kg)   SpO2 93%   BMI 35.77 kg/m²     General appearance: Pleasant overweight elderly female resting in bed with daughter by her side  Eyes: Sclera clear without conjunctival injection; PERRLA; EOMI  ENT: Mucous membranes moist without thrush; normal dentition  Neck: Supple without meningismus; no goiter; no carotid bruit bilaterally  Cardiovascular: Regular rhythm without ectopy; normal S1-S2 with no murmurs; trace nonpitting BLE peripheral edema; no JVD  Respiratory: Positive tachypnea; diminished breath sounds throughout with I/E wheezing and bronchospastic cough  Gastrointestinal: Abdomen obese non-tender, not distended; bowel sounds normal; no masses/organomegaly appreciated  Musculoskeletal: FROM spine and extremities x4; no gross deformity  Neurology: A&O x3; cranial nerves 2-12 grossly intact; motor 5/5  BUE/BLE; no seizure activity  Psychiatry: Well-groomed with good eye contact; appropriate affect; no visual/auditory hallucination  Skin: Warm, dry, normal turgor, no rash  PV: 2/4 radial and dorsalis pedis bilaterally; brisk capillary refill    Labs:  CBC:   Lab Results   Component Value Date/Time    WBC 10.7 03/12/2023 07:19 PM    RBC 4.87 03/12/2023 07:19 PM    HGB 15.1 03/12/2023 07:19 PM    HCT 46.5 03/12/2023 07:19 PM    MCV 95.6 03/12/2023 07:19 PM    MCH 31.1 03/12/2023 07:19 PM    MCHC 32.6 03/12/2023 07:19 PM    RDW 14.5 03/12/2023 07:19 PM     03/12/2023 07:19 PM    MPV 7.4 03/12/2023 07:19 PM     BMP:    Lab Results   Component Value Date/Time     03/12/2023 01:09 PM    K 4.3 03/12/2023 01:09 PM    CL 98 03/12/2023 01:09 PM    CO2 25 03/12/2023 01:09 PM    BUN 12 03/12/2023 01:09 PM    CREATININE 0.8 03/12/2023 01:09 PM    CALCIUM 11.2 03/12/2023 01:09 PM    GFRAA >60 09/07/2010 11:25 AM    LABGLOM >60 03/12/2023 01:09 PM    GLUCOSE 93 03/12/2023 01:09 PM     CT CHEST PULMONARY EMBOLISM W CONTRAST   Final Result   Acute appeared pulmonary embolism in the anterior right upper lobe. Age-indeterminate thread-like pulmonary emboli in the right lower lobe. Although acute PE is not excluded, these may be subacute to chronic. Scattered peripheral ground-glass infiltrates within both lungs, greatest in   the upper lungs. These may relate to active interstitial fibrosis. However,   infectious etiologies should be considered as well, especially atypical   etiologies. Findings were discussed with Scarlett Phillips at 3:50 pm on 3/12/2023. XR CHEST PORTABLE   Final Result   1. No acute radiographic finding to account for patient's shortness of breath. 2. Questionable nodular density in the right mid lung. Consider CT chest for   further evaluation.          VL Extremity Venous Bilateral    (Results Pending)         EKG:    Ventricular Rate 77 BPM QTc Calculation (Bazett) 393 ms   Atrial Rate 77 BPM P Axis 15 degrees   P-R Interval 144 ms R Axis -15 degrees   QRS Duration 68 ms T Axis 20 degrees   Q-T Interval 348 ms Diagnosis Sinus rhythm with Premature supraventricular complexesLow voltage QRSBorderline      I visualized CXR images and EKG strips personally and agree with documented interpretation    Discussed case  with ED provider    Problem List:  Principal Problem:    Acute pulmonary embolism [facility])  Active Problems:    Bilateral pneumonia    Bronchiectasis with acute lower respiratory infection ([facility]    IPF (idiopathic pulmonary fibrosis) ([facility]    Acute pulmonary embolism without acute cor pulmonale, unspecified pulmonary embolism type (HCC)  Resolved Problems:    * No resolved hospital problems.  *        Consults:  IP CONSULT TO HOSPITALIST      Assessment/Plan:     Acute PE  -Patient admitted to floor for continuous telemetry and pulse oximetry monitoring  -Encourage incentive spirometry; PRN supplemental O2 scheduled  -Patient initiated on high-dose weight-based heparin GTT overnight with serial aPTT/ Anti-FXa levels to ensure therapeutic anticoagulation  -ECHO scheduled to rule out right ventricular heart strain  -BLE venous duplex scheduled to identify initial source of clot burden   -Question underlying malignancy with hypercalcemia and apparent hypercoagulability of unknown etiology    Bilateral pneumonia  -Patient admitted to floor for continuous telemetry and SPO2 monitoring  -Supplemental O2 scheduled for pulse ox < 90%; respiratory isolation measures in place  --IVF resuscitation initiated in ED and will be continued overnight with strict I/O's documentation  --Blood and sputum cultures ordered STAT  -Following collection of cultures, patient initiated on broad-spectrum IV antibiotics including Zosyn and Levaquin due to history of bronchiectasis with increased Pseudomonas risk  -Encourage incentive spirometry and aggressive pulmonary toilet as tolerated  -Serial labs including CBC, CMP, lactic acid, procalcitonin scheduled to monitor disease progression     IPF  -Continuous pulse oximetry monitoring initiated with PRN supplemental O2   -Continue home maintenance MDIs/nebulizer treatment including Advair  -Encourage aggressive pulmonary toilet including incentive spirometry every 4H while awake  -DuoNebs scheduled Q4H while awake  -IV Solu-Medrol scheduled Q12H; POC glucose checks with PRN insulin coverage    DVT prophylaxis-High weight-based heparin DVT prophylaxis   Code status-full code  Diet-cardiac TIM  IV access-PIV established in ED      Admit as inpatient. Anticipate hospitalization spanning more than two midnights for investigation and treatment of the above medically necessary diagnoses. Comment: Please note this report has been produced using speech recognition software and may contain errors related to that system including errors in grammar, punctuation, and spelling, as well as words and phrases that may be inappropriate. If there are any questions or concerns please feel free to contact the dictating provider for clarification.          Tiffanie Donnelly MD    3/13/2023 5:44 AM

## 2023-03-13 ENCOUNTER — APPOINTMENT (OUTPATIENT)
Dept: VASCULAR LAB | Age: 83
DRG: 176 | End: 2023-03-13
Payer: MEDICARE

## 2023-03-13 PROBLEM — G47.33 OSA (OBSTRUCTIVE SLEEP APNEA): Status: ACTIVE | Noted: 2023-03-13

## 2023-03-13 PROBLEM — J47.9 BRONCHIECTASIS WITHOUT COMPLICATION (HCC): Status: ACTIVE | Noted: 2023-03-12

## 2023-03-13 PROBLEM — D72.829 LEUKOCYTOSIS: Status: ACTIVE | Noted: 2023-03-13

## 2023-03-13 PROBLEM — E66.9 OBESITY (BMI 35.0-39.9 WITHOUT COMORBIDITY): Status: ACTIVE | Noted: 2023-03-13

## 2023-03-13 LAB
A/G RATIO: 1.9 (ref 1.1–2.2)
ALBUMIN SERPL-MCNC: 4.9 G/DL (ref 3.4–5)
ALP BLD-CCNC: 96 U/L (ref 40–129)
ALT SERPL-CCNC: 21 U/L (ref 10–40)
ANION GAP SERPL CALCULATED.3IONS-SCNC: 17 MMOL/L (ref 3–16)
ANTI-XA UNFRAC HEPARIN: 0.34 IU/ML (ref 0.3–0.7)
ANTI-XA UNFRAC HEPARIN: 0.52 IU/ML (ref 0.3–0.7)
AST SERPL-CCNC: 23 U/L (ref 15–37)
BASOPHILS ABSOLUTE: 0 K/UL (ref 0–0.2)
BASOPHILS RELATIVE PERCENT: 0.2 %
BILIRUB SERPL-MCNC: 0.4 MG/DL (ref 0–1)
BUN BLDV-MCNC: 13 MG/DL (ref 7–20)
CALCIUM IONIZED: 1.18 MMOL/L (ref 1.12–1.32)
CALCIUM SERPL-MCNC: 12.1 MG/DL (ref 8.3–10.6)
CHLORIDE BLD-SCNC: 101 MMOL/L (ref 99–110)
CO2: 20 MMOL/L (ref 21–32)
CREAT SERPL-MCNC: 0.9 MG/DL (ref 0.6–1.2)
EKG ATRIAL RATE: 111 BPM
EKG DIAGNOSIS: NORMAL
EKG P AXIS: -24 DEGREES
EKG P-R INTERVAL: 128 MS
EKG Q-T INTERVAL: 314 MS
EKG QRS DURATION: 66 MS
EKG QTC CALCULATION (BAZETT): 427 MS
EKG R AXIS: -13 DEGREES
EKG T AXIS: 29 DEGREES
EKG VENTRICULAR RATE: 111 BPM
EOSINOPHILS ABSOLUTE: 0 K/UL (ref 0–0.6)
EOSINOPHILS RELATIVE PERCENT: 0 %
ESTIMATED AVERAGE GLUCOSE: 105.4 MG/DL
GFR SERPL CREATININE-BSD FRML MDRD: >60 ML/MIN/{1.73_M2}
GLUCOSE BLD-MCNC: 113 MG/DL (ref 70–99)
GLUCOSE BLD-MCNC: 159 MG/DL (ref 70–99)
HBA1C MFR BLD: 5.3 %
HCT VFR BLD CALC: 45.4 % (ref 36–48)
HEMOGLOBIN: 14.7 G/DL (ref 12–16)
LACTIC ACID: 3.5 MMOL/L (ref 0.4–2)
LV EF: 58 %
LVEF MODALITY: NORMAL
LYMPHOCYTES ABSOLUTE: 1.3 K/UL (ref 1–5.1)
LYMPHOCYTES RELATIVE PERCENT: 7.7 %
MCH RBC QN AUTO: 31.3 PG (ref 26–34)
MCHC RBC AUTO-ENTMCNC: 32.3 G/DL (ref 31–36)
MCV RBC AUTO: 96.9 FL (ref 80–100)
MONOCYTES ABSOLUTE: 1 K/UL (ref 0–1.3)
MONOCYTES RELATIVE PERCENT: 5.8 %
NEUTROPHILS ABSOLUTE: 14.7 K/UL (ref 1.7–7.7)
NEUTROPHILS RELATIVE PERCENT: 86.3 %
PARATHYROID HORMONE INTACT: 87.9 PG/ML (ref 14–72)
PDW BLD-RTO: 14.6 % (ref 12.4–15.4)
PERFORMED ON: ABNORMAL
PH VENOUS: 7.53 (ref 7.35–7.45)
PLATELET # BLD: 379 K/UL (ref 135–450)
PMV BLD AUTO: 7.7 FL (ref 5–10.5)
POTASSIUM REFLEX MAGNESIUM: 4.6 MMOL/L (ref 3.5–5.1)
RBC # BLD: 4.68 M/UL (ref 4–5.2)
REASON FOR REJECTION: NORMAL
REJECTED TEST: NORMAL
SODIUM BLD-SCNC: 138 MMOL/L (ref 136–145)
TOTAL PROTEIN: 7.5 G/DL (ref 6.4–8.2)
VITAMIN D 25-HYDROXY: 34.9 NG/ML
WBC # BLD: 17 K/UL (ref 4–11)

## 2023-03-13 PROCEDURE — 82306 VITAMIN D 25 HYDROXY: CPT

## 2023-03-13 PROCEDURE — 82542 COL CHROMOTOGRAPHY QUAL/QUAN: CPT

## 2023-03-13 PROCEDURE — 99223 1ST HOSP IP/OBS HIGH 75: CPT | Performed by: INTERNAL MEDICINE

## 2023-03-13 PROCEDURE — 84165 PROTEIN E-PHORESIS SERUM: CPT

## 2023-03-13 PROCEDURE — 2580000003 HC RX 258: Performed by: INTERNAL MEDICINE

## 2023-03-13 PROCEDURE — 80053 COMPREHEN METABOLIC PANEL: CPT

## 2023-03-13 PROCEDURE — 36415 COLL VENOUS BLD VENIPUNCTURE: CPT

## 2023-03-13 PROCEDURE — 94640 AIRWAY INHALATION TREATMENT: CPT

## 2023-03-13 PROCEDURE — 6370000000 HC RX 637 (ALT 250 FOR IP): Performed by: NURSE PRACTITIONER

## 2023-03-13 PROCEDURE — 97165 OT EVAL LOW COMPLEX 30 MIN: CPT

## 2023-03-13 PROCEDURE — 85520 HEPARIN ASSAY: CPT

## 2023-03-13 PROCEDURE — 97161 PT EVAL LOW COMPLEX 20 MIN: CPT

## 2023-03-13 PROCEDURE — 85025 COMPLETE CBC W/AUTO DIFF WBC: CPT

## 2023-03-13 PROCEDURE — 83605 ASSAY OF LACTIC ACID: CPT

## 2023-03-13 PROCEDURE — 6370000000 HC RX 637 (ALT 250 FOR IP): Performed by: HOSPITALIST

## 2023-03-13 PROCEDURE — 6360000002 HC RX W HCPCS: Performed by: INTERNAL MEDICINE

## 2023-03-13 PROCEDURE — 93306 TTE W/DOPPLER COMPLETE: CPT

## 2023-03-13 PROCEDURE — 97530 THERAPEUTIC ACTIVITIES: CPT

## 2023-03-13 PROCEDURE — 93005 ELECTROCARDIOGRAM TRACING: CPT | Performed by: INTERNAL MEDICINE

## 2023-03-13 PROCEDURE — 1200000000 HC SEMI PRIVATE

## 2023-03-13 PROCEDURE — 97535 SELF CARE MNGMENT TRAINING: CPT

## 2023-03-13 PROCEDURE — 6360000002 HC RX W HCPCS: Performed by: HOSPITALIST

## 2023-03-13 PROCEDURE — 93970 EXTREMITY STUDY: CPT

## 2023-03-13 PROCEDURE — 84155 ASSAY OF PROTEIN SERUM: CPT

## 2023-03-13 PROCEDURE — 82330 ASSAY OF CALCIUM: CPT

## 2023-03-13 PROCEDURE — 2580000003 HC RX 258: Performed by: HOSPITALIST

## 2023-03-13 PROCEDURE — 83970 ASSAY OF PARATHORMONE: CPT

## 2023-03-13 PROCEDURE — 83036 HEMOGLOBIN GLYCOSYLATED A1C: CPT

## 2023-03-13 PROCEDURE — 83883 ASSAY NEPHELOMETRY NOT SPEC: CPT

## 2023-03-13 PROCEDURE — 93010 ELECTROCARDIOGRAM REPORT: CPT | Performed by: INTERNAL MEDICINE

## 2023-03-13 RX ORDER — METHYLPREDNISOLONE SODIUM SUCCINATE 40 MG/ML
40 INJECTION, POWDER, LYOPHILIZED, FOR SOLUTION INTRAMUSCULAR; INTRAVENOUS EVERY 12 HOURS
Status: DISCONTINUED | OUTPATIENT
Start: 2023-03-13 | End: 2023-03-13

## 2023-03-13 RX ORDER — DEXTROSE MONOHYDRATE 100 MG/ML
INJECTION, SOLUTION INTRAVENOUS CONTINUOUS PRN
Status: DISCONTINUED | OUTPATIENT
Start: 2023-03-13 | End: 2023-03-15 | Stop reason: HOSPADM

## 2023-03-13 RX ORDER — SODIUM CHLORIDE 9 MG/ML
INJECTION, SOLUTION INTRAVENOUS CONTINUOUS
Status: DISCONTINUED | OUTPATIENT
Start: 2023-03-13 | End: 2023-03-14

## 2023-03-13 RX ORDER — INSULIN LISPRO 100 [IU]/ML
0-4 INJECTION, SOLUTION INTRAVENOUS; SUBCUTANEOUS NIGHTLY
Status: DISCONTINUED | OUTPATIENT
Start: 2023-03-13 | End: 2023-03-13

## 2023-03-13 RX ORDER — INSULIN LISPRO 100 [IU]/ML
0-8 INJECTION, SOLUTION INTRAVENOUS; SUBCUTANEOUS
Status: DISCONTINUED | OUTPATIENT
Start: 2023-03-13 | End: 2023-03-13

## 2023-03-13 RX ADMIN — SODIUM CHLORIDE, PRESERVATIVE FREE 10 ML: 5 INJECTION INTRAVENOUS at 09:34

## 2023-03-13 RX ADMIN — SODIUM CHLORIDE, PRESERVATIVE FREE 10 ML: 5 INJECTION INTRAVENOUS at 23:00

## 2023-03-13 RX ADMIN — SODIUM CHLORIDE: 9 INJECTION, SOLUTION INTRAVENOUS at 10:14

## 2023-03-13 RX ADMIN — PANTOPRAZOLE SODIUM 40 MG: 40 TABLET, DELAYED RELEASE ORAL at 16:19

## 2023-03-13 RX ADMIN — IPRATROPIUM BROMIDE AND ALBUTEROL SULFATE 1 AMPULE: 2.5; .5 SOLUTION RESPIRATORY (INHALATION) at 12:06

## 2023-03-13 RX ADMIN — Medication 1 LOZENGE: at 06:39

## 2023-03-13 RX ADMIN — PIPERACILLIN AND TAZOBACTAM 3375 MG: 3; .375 INJECTION, POWDER, FOR SOLUTION INTRAVENOUS at 09:21

## 2023-03-13 RX ADMIN — ACETAMINOPHEN 650 MG: 325 TABLET ORAL at 03:07

## 2023-03-13 RX ADMIN — METHYLPREDNISOLONE SODIUM SUCCINATE 40 MG: 40 INJECTION, POWDER, FOR SOLUTION INTRAMUSCULAR; INTRAVENOUS at 06:39

## 2023-03-13 RX ADMIN — FUROSEMIDE 20 MG: 20 TABLET ORAL at 09:33

## 2023-03-13 RX ADMIN — IPRATROPIUM BROMIDE AND ALBUTEROL SULFATE 1 AMPULE: 2.5; .5 SOLUTION RESPIRATORY (INHALATION) at 19:46

## 2023-03-13 RX ADMIN — Medication 1 LOZENGE: at 20:13

## 2023-03-13 RX ADMIN — Medication 2 PUFF: at 08:08

## 2023-03-13 RX ADMIN — HEPARIN SODIUM 1070 UNITS/HR: 10000 INJECTION, SOLUTION INTRAVENOUS at 15:44

## 2023-03-13 RX ADMIN — Medication 2 PUFF: at 19:51

## 2023-03-13 RX ADMIN — IPRATROPIUM BROMIDE AND ALBUTEROL SULFATE 1 AMPULE: 2.5; .5 SOLUTION RESPIRATORY (INHALATION) at 08:08

## 2023-03-13 RX ADMIN — LEVOTHYROXINE SODIUM 50 MCG: 50 TABLET ORAL at 06:39

## 2023-03-13 RX ADMIN — LEVOFLOXACIN 750 MG: 5 INJECTION, SOLUTION INTRAVENOUS at 09:49

## 2023-03-13 RX ADMIN — PIPERACILLIN AND TAZOBACTAM 3375 MG: 3; .375 INJECTION, POWDER, FOR SOLUTION INTRAVENOUS at 15:25

## 2023-03-13 RX ADMIN — PANTOPRAZOLE SODIUM 40 MG: 40 TABLET, DELAYED RELEASE ORAL at 06:39

## 2023-03-13 RX ADMIN — IPRATROPIUM BROMIDE AND ALBUTEROL SULFATE 1 AMPULE: 2.5; .5 SOLUTION RESPIRATORY (INHALATION) at 16:02

## 2023-03-13 RX ADMIN — Medication 1 LOZENGE: at 03:03

## 2023-03-13 ASSESSMENT — ENCOUNTER SYMPTOMS
EYE REDNESS: 0
EYE DISCHARGE: 0
NAUSEA: 0
DIARRHEA: 0
SINUS PAIN: 0
SHORTNESS OF BREATH: 1
SORE THROAT: 0
ABDOMINAL PAIN: 0
RHINORRHEA: 0
CHEST TIGHTNESS: 1
SINUS PRESSURE: 0
COUGH: 1
VOMITING: 0
CONSTIPATION: 0

## 2023-03-13 ASSESSMENT — PAIN SCALES - GENERAL
PAINLEVEL_OUTOF10: 0
PAINLEVEL_OUTOF10: 0
PAINLEVEL_OUTOF10: 8
PAINLEVEL_OUTOF10: 0

## 2023-03-13 ASSESSMENT — PAIN DESCRIPTION - LOCATION
LOCATION: HEAD
LOCATION: HEAD

## 2023-03-13 NOTE — CONSULTS
Consult placed    Who:ELEUTERIO [address] [facility]  Date:3/13/2023,  Time:9:07 AM        Electronically signed by Troy Thomas on 3/13/2023 at 9:07 AM

## 2023-03-13 NOTE — PROGRESS NOTES
Hospitalist Progress Note      PCP: Adal López MD    Date of Admission: 3/12/2023    Chief Complaint: cough    Hospital Course:   Lili Barcenas is a 82 y.o. female who presented to the ED to be evaluated for increased dyspnea and cough in the setting of known idiopathic pulmonary fibrosis (IPF).  She reports that more recently she has noted her home pulse oximetry reading as low as 90%, which is atypical for her because she is not oxygen dependent at baseline.  Upon further questioning patient does endorse intermittent chest pain, pleuritic in nature.  She denies unilateral leg edema or pain, but does note BLE swelling chronically.  Patient has no previous history of blood clots, no known hypercoagulability, and is not on chronic anticoagulation.     Upon her to the ED EKG was obtained revealing NSR with PVCs and low voltage QRS.  Chest CT with PE protocol demonstrates acute PE in anterior RUL, as well as age-indeterminate emboli in RLL.  Patient also with scattered peripheral groundglass infiltrates bilaterally suggestive of infection with atypical bacteria.  Notable labs include: Lactate 2.4 and hypercalcemia 11.2.  Following collection of blood cultures patient received a dosage of IV Rocephin and Zithromax.  She also was treated with IV Solu-Medrol and DuoNebs due to bronchospasm upon arrival.  Finally, she was placed on high-dose weight-based heparin drip to treat acute PE, prior to rest for admission.    Subjective: SOB, cough unchanged. Denies chest pains.       Medications:  Reviewed    Infusion Medications    dextrose      sodium chloride 100 mL/hr at 03/13/23 1014    heparin (PORCINE) Infusion 1,070 Units/hr (03/12/23 4422)    sodium chloride       Scheduled Medications    methylPREDNISolone  40 mg IntraVENous Q12H    furosemide  20 mg Oral Daily    pantoprazole  40 mg Oral BID AC    levothyroxine  50 mcg Oral Daily    piperacillin-tazobactam  3,375 mg IntraVENous Q8H    levofloxacin  750  mg IntraVENous Q24H    sodium chloride flush  5-40 mL IntraVENous 2 times per day    ipratropium-albuterol  1 ampule Inhalation Q4H WA    mometasone-formoterol  2 puff Inhalation BID     PRN Meds: benzocaine-menthol, glucose, dextrose bolus **OR** dextrose bolus, glucagon (rDNA), dextrose, carboxymethylcellulose PF, sodium chloride flush, sodium chloride, ondansetron **OR** ondansetron, polyethylene glycol, acetaminophen **OR** acetaminophen, potassium chloride **OR** potassium alternative oral replacement **OR** potassium chloride, magnesium sulfate, heparin (porcine), heparin (porcine), perflutren lipid microspheres      Intake/Output Summary (Last 24 hours) at 3/13/2023 1354  Last data filed at 3/13/2023 1313  Gross per 24 hour   Intake 240 ml   Output 1600 ml   Net -1360 ml       Physical Exam Performed:    BP (!) 150/70 Comment: nurse notified  Pulse (!) 102 Comment: nurse notified  Temp 97.5 °F (36.4 °C) (Oral)   Resp 18   Ht 5' 1\" (1.549 m)   Wt 189 lb 4.8 oz (85.9 kg)   SpO2 95%   BMI 35.77 kg/m²     General appearance: No apparent distress, appears stated age and cooperative. HEENT: Pupils equal, round, and reactive to light. Conjunctivae/corneas clear. Neck: Supple, with full range of motion. No jugular venous distention. Trachea midline. Respiratory:  Normal respiratory effort. Clear to auscultation, bilaterally without Rales/Wheezes/Rhonchi. Cardiovascular: Regular rate and rhythm with normal S1/S2 without murmurs, rubs or gallops. Abdomen: Soft, non-tender, non-distended with normal bowel sounds. Musculoskeletal: No clubbing, cyanosis or edema bilaterally. Full range of motion without deformity. Skin: Skin color, texture, turgor normal.  No rashes or lesions. Neurologic:  Neurovascularly intact without any focal sensory/motor deficits.  Cranial nerves: II-XII intact, grossly non-focal.  Psychiatric: Alert and oriented, thought content appropriate, normal insight  Capillary Refill: Brisk, 3 seconds, normal   Peripheral Pulses: +2 palpable, equal bilaterally       Labs:   Recent Labs     03/12/23  1309 03/12/23  1919 03/13/23  0632   WBC 9.5 10.7 17.0*   HGB 14.5 15.1 14.7   HCT 44.1 46.5 45.4    322 379     Recent Labs     03/12/23  1309 03/13/23  0632   * 138   K 4.3 4.6   CL 98* 101   CO2 25 20*   BUN 12 13   CREATININE 0.8 0.9   CALCIUM 11.2* 12.1*     Recent Labs     03/12/23  1309 03/13/23  0632   AST 25 23   ALT 20 21   BILITOT 0.4 0.4   ALKPHOS 92 96     Recent Labs     03/12/23 1919   INR 1.13     Recent Labs     03/12/23  1309 03/12/23 1919 03/12/23  2237   TROPONINI <0.01 <0.01 <0.01       Urinalysis:      Lab Results   Component Value Date/Time    NITRU Negative 03/12/2023 02:01 PM    WBCUA 21-50 03/12/2023 02:01 PM    BACTERIA Rare 03/12/2023 02:01 PM    RBCUA 3-4 03/12/2023 02:01 PM    BLOODU TRACE-INTACT 03/12/2023 02:01 PM    SPECGRAV 1.010 03/12/2023 02:01 PM    GLUCOSEU Negative 03/12/2023 02:01 PM       Radiology:  CT CHEST PULMONARY EMBOLISM W CONTRAST   Final Result   Acute appeared pulmonary embolism in the anterior right upper lobe. Age-indeterminate thread-like pulmonary emboli in the right lower lobe. Although acute PE is not excluded, these may be subacute to chronic. Scattered peripheral ground-glass infiltrates within both lungs, greatest in   the upper lungs. These may relate to active interstitial fibrosis. However,   infectious etiologies should be considered as well, especially atypical   etiologies. Findings were discussed with Amrita Ramsey at 3:50 pm on 3/12/2023. XR CHEST PORTABLE   Final Result   1. No acute radiographic finding to account for patient's shortness of breath. 2. Questionable nodular density in the right mid lung. Consider CT chest for   further evaluation.          VL Extremity Venous Bilateral    (Results Pending)       IP CONSULT TO HOSPITALIST  IP CONSULT TO NEPHROLOGY  IP CONSULT TO PULMONOLOGY    Assessment/Plan:    Active Hospital Problems    Diagnosis     Acute pulmonary embolism ([facility] [I26.99]      Priority: Medium    Bilateral pneumonia [J18.9]      Priority: Medium    IPF (idiopathic pulmonary fibrosis) ([facility]) [J84.112]      Priority: Medium    [facility] [J47.0]      Priority: Medium    Acute pulmonary embolism without acute cor pulmonale, unspecified pulmonary embolism type ([facility] [I26.99]      Priority: Medium     Acute PE  - started on heparin gtt  - will change to eliquis on discharge    Pulmonary fibrosis  - pulmonology consulted  - continue steroids  - continue inhalers  - low concern for pneumonia. Defer abx to pulm    Sinus tachycardia  - likely related to acute PE  - continue to monitor    Hypercalcemia  - reportedly with familial hypocalciuric hypercalcemia  - nephrology consulted  - PTH, urine studies ordered  - continue IVF    Hypothyroidism  - continue synthroid    Obesity  With Body mass index is 35.77 kg/m². Complicating assessment and treatment. Placing patient at risk for multiple co-morbidities as well as early death and contributing to the patient's presentation. Counseled on weight loss. DVT Prophylaxis: heparin gtt  Diet: ADULT DIET;  Regular; Low Fat/Low Chol/High Fiber/TIM; No Concentrated sweets  Code Status: Full Code  PT/OT Eval Status: ordered    Dispo - home in 1-2 days    Appropriate for A1 Discharge Unit: No      Chelo Gleason MD

## 2023-03-13 NOTE — PLAN OF CARE
Problem: Discharge Planning  Goal: Discharge to home or other facility with appropriate resources  Outcome: Progressing     Problem: Pain  Goal: Verbalizes/displays adequate comfort level or baseline comfort level  Outcome: Progressing     Problem: ABCDS Injury Assessment  Goal: Absence of physical injury  Outcome: Progressing     Problem: Respiratory - Adult  Goal: Achieves optimal ventilation and oxygenation  3/13/2023 1052 by Dejon Cosme RN  Outcome: Progressing  3/12/2023 2119 by Azul Kothari RN  Outcome: Progressing  Flowsheets (Taken 3/12/2023 2119)  Achieves optimal ventilation and oxygenation:   Assess for changes in respiratory status   Assess for changes in mentation and behavior   Respiratory therapy support as indicated   Assess and instruct to report shortness of breath or any respiratory difficulty

## 2023-03-13 NOTE — PROGRESS NOTES
Physical Therapy  Facility/Department: [place] [address] - MED SURG  Physical Therapy Initial Assessment/ DC summary    Name: Janene Valenzuela  : 1940  MRN: [id number]  Date of Service: 3/13/2023    Discharge Recommendations:  Home with assist PRN   PT Equipment Recommendations  Equipment Needed: No      Patient Diagnosis(es): The primary encounter diagnosis was Acute pulmonary embolism without acute cor pulmonale, unspecified pulmonary embolism type ([address] A diagnosis of Shortness of breath was also pertinent to this visit. Past Medical History:  has a past medical history of Allergic rhinitis, mild, Arthritis, Asthma, Bloating, Cancer ([address] Swallowing difficulty, and Thyroid disease. Past Surgical History:  has a past surgical history that includes Cholecystectomy; Hysterectomy; Tubal ligation; [place] tooth extraction; Upper gastrointestinal endoscopy (12); and Colonoscopy (14). Assessment   Assessment: Pt functioning near baseline. Limited due to activity restrictions, however pt is independent at baseline and was able to demonstrate today no LOB with bed mobility and ambulation to the bathroom and short distance in room. Good family support and pt expressed no concerns with change in strength or balance. No acute PT needs indicated at this time.   Therapy Prognosis: Excellent  Decision Making: Low Complexity  Barriers to Learning: [place]  Requires PT Follow-Up: No  Activity Tolerance  Activity Tolerance: Patient tolerated treatment well     Plan   Physcial Therapy Plan  General Plan: Discharge with evaluation only  Safety Devices  Type of Devices: Call light within reach, Gait belt, Nurse notified  Restraints  Restraints Initially in Place: No     Restrictions  Restrictions/Precautions  Restrictions/Precautions: Bedrest with Bathroom Privileges  Required Braces or Orthoses?: No     Subjective   Pain: denies  General  Chart Reviewed: Yes  Patient assessed for rehabilitation services?: Yes  Response To Previous Treatment: Not applicable  Family / Caregiver Present: Yes  Referring Practitioner: Eren Slade MD  Referral Date : 03/13/23  Diagnosis: SOB  Follows Commands: Within Functional Limits  General Comment  Comments: cleared by nursing  Subjective  Subjective: pt resting in bed. Denies pain         Social/Functional History  Social/Functional History  Lives With: Son (son works during the day)  Type of Home: [address]: One level, Laundry in basement  Home Access: Stairs to enter without rails  Entrance Stairs - Number of Steps: 2  Bathroom Shower/Tub: Tub/Shower unit  Bathroom Equipment: Grab bars in shower, Shower chair  Home Equipment: [address]  Has the patient had two or more falls in the past year or any fall with injury in the past year?: No  ADL Assistance: Independent  Homemaking Assistance: Independent (shares chores with son)  Ambulation Assistance: Independent (without AD)  Transfer Assistance: Independent  Active : Yes  Occupation: Retired  Vision/Hearing  Vision  Vision: Within Functional Limits  Hearing  Hearing: Exceptions to [facility]  Hearing Exceptions: Bilateral hearing aid    Cognition   Orientation  Overall Orientation Status: Within Normal Limits     Objective   Heart Rate: 98  Heart Rate Source: Monitor  BP: 136/65  BP Location: Left upper arm  BP Method: Automatic  Patient Position: Semi fowlers  MAP (Calculated): 89  Resp: 18  SpO2: 95 %  O2 Device: None (Room air)        Gross Assessment  AROM: Within functional limits  Strength: Within functional limits        Strength RLE  Strength RLE: WFL  Strength LLE  Strength LLE: WFL           Bed mobility  Supine to Sit: Supervision  Sit to Supine: Supervision  Bed Mobility Comments: cues for safety due to IV line  Transfers  Sit to Stand: Supervision  Stand to Sit: Supervision  Ambulation  Device: No Device  Assistance: Supervision  Gait Deviations: None  Distance: 10' + 15'  Comments: no LOB.  cues to slow due to IV line and pole Balance  Posture: Good  Sitting - Static: Good  Sitting - Dynamic: Good  Standing - Static: Good  Standing - Dynamic: Good         AM-PAC Score  AM-PAC Inpatient Mobility Raw Score : 21 (03/13/23 0900)  AM-PAC Inpatient T-Scale Score : 50.25 (03/13/23 0900)  Mobility Inpatient CMS 0-100% Score: 28.97 (03/13/23 0900)  Mobility Inpatient CMS G-Code Modifier : CJ (03/13/23 0900)        Goals  Short Term Goals  Time Frame for Short Term Goals: 1 session  Short Term Goal 1: Pt will comlete mobility with supervision - MET  Patient Goals   Patient Goals : to go home       Education  Patient Education  Education Given To: Patient; Family  Education Provided: Role of Therapy;Plan of Care;Home Exercise Program;Energy Conservation  Education Method: Demonstration  Barriers to Learning: Hearing  Education Outcome: Verbalized understanding;Demonstrated understanding      Therapy Time   Individual Concurrent Group Co-treatment   Time In 0850         Time Out 0910         Minutes 20         Timed Code Treatment Minutes: 10 Minutes       Jaclyn Hennessy PT

## 2023-03-13 NOTE — CONSULTS
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  It appears per Dr Antonio Hands notes seen by Dr Tra Torres, endocrinologist   But couldn't find the chart  Plan:    Agree with checking PTH  We will treat hypercalcemia with fluids  Check SPEP, light chains also                       Assessment :     Hypercalcemia  Calcium 12.1 at the time of consult  Albumin is on the high side at 4.9 on consult     Acidosis  20 CO2 on  consult  Anion gap 17  Lactic acidosis low at high at 3.5  Lactic acidosis likely due to hypoxia      Idiopathic lung fibrosis  Acute PE  Bilateral pneumonia      [facility] would like to thank Lindy Morales MD   for opportunity to serve this patient      Please call with questions at-   24 Hrs Answering service [phone] or  7 am- 5 pm via Perfect serve or cell phone  Jagdish Sanchez MD          CC/reason for consult :     Hypercalcemia     HPI :     Amol Shepard is a 80 y.o. female presented to   the hospital on 3/12/2023 with known pulmonary fibrosis. Came to the hospital with increased shortness of breath and cough. She had a drop in oxygen saturation at home. Also complaining of swelling of the leg chronically. She came to the ED when she was found to have PE per CT scan. Also possible atypical bacterial infection. On presentation her lactate was 2.4  Calcium was 11.2. She is getting IV antibiotics.   Getting heparin drip    In the meantime we are consulted for Hypercalcemia and related issues  ROS:     Seen with- family    positives in bold   Constitutional:  fever, chills, weakness, weight change, fatigue  Skin:  rash, pruritus, hair loss, bruising, dry skin, petechiae  Head, Face, Neck   headaches, swelling,  cervical adenopathy  Respiratory: shortness of breath, cough, or wheezing  Cardiovascular: chest pain, palpitations, dizzy, edema  Gastrointestinal: nausea, vomiting, diarrhea, constipation,belly pain    Yellow skin, blood in stool  Musculoskeletal:  back pain, muscle weakness, gait problems,       joint pain or swelling. Genitourinary:  dysuria, poor urine flow, flank pain, blood in urine  Neurologic:  vertigo, TIA'S, syncope, seizures, focal weakness  Psychosocial:  insomnia, anxiety, or depression. Additional positive findings:                    All other remaining systems are negative or unable to obtain        PMH/PSH/SH/Family History:     Past Medical History:   Diagnosis Date    Allergic rhinitis, mild     Arthritis     Asthma     Bloating     Cancer (HCC)     skin    Swallowing difficulty     Thyroid disease        Past Surgical History:   Procedure Laterality Date    CHOLECYSTECTOMY      COLONOSCOPY  7/24/14    colon Bx,s/ esoph Bx    HYSTERECTOMY (CERVIX STATUS UNKNOWN)      TUBAL LIGATION      UPPER GASTROINTESTINAL ENDOSCOPY  5/22/12    bx    WISDOM TOOTH EXTRACTION          reports that she quit smoking about 54 years ago. She has never used smokeless tobacco. She reports current alcohol use. She reports that she does not use drugs. family history includes Heart Disease in her father and mother.          Medication:     Current Facility-Administered Medications: benzocaine-menthol (CEPACOL SORE THROAT) lozenge 1 lozenge, 1 lozenge, Oral, Q2H PRN  methylPREDNISolone sodium (SOLU-MEDROL) injection 40 mg, 40 mg, IntraVENous, Q12H  glucose chewable tablet 16 g, 4 tablet, Oral, PRN  dextrose bolus 10% 125 mL, 125 mL, IntraVENous, PRN **OR** dextrose bolus 10% 250 mL, 250 mL, IntraVENous, PRN  glucagon (rDNA) injection 1 mg, 1 mg, SubCUTAneous, PRN  dextrose 10 % infusion, , IntraVENous, Continuous PRN  0.9 % sodium chloride infusion, , IntraVENous, Continuous  heparin 25,000 units in dextrose 5% 250 mL (premix) infusion, 1,070 Units/hr, IntraVENous, Continuous  carboxymethylcellulose PF (THERATEARS) 1 % ophthalmic gel 2 drop, 2 drop, Both Eyes, PRN  furosemide (LASIX) tablet 20 mg, 20 mg, Oral, Daily  pantoprazole (PROTONIX) tablet 40 mg, 40 mg, Oral, BID AC  levothyroxine (SYNTHROID) tablet 50 mcg, 50 mcg, Oral, Daily  piperacillin-tazobactam (ZOSYN) 3,375 mg in sodium chloride 0.9 % 50 mL IVPB (mini-bag), 3,375 mg, IntraVENous, Q8H  levoFLOXacin (LEVAQUIN) 750 MG/150ML infusion 750 mg, 750 mg, IntraVENous, Q24H  sodium chloride flush 0.9 % injection 5-40 mL, 5-40 mL, IntraVENous, 2 times per day  sodium chloride flush 0.9 % injection 5-40 mL, 5-40 mL, IntraVENous, PRN  0.9 % sodium chloride infusion, , IntraVENous, PRN  ondansetron (ZOFRAN-ODT) disintegrating tablet 4 mg, 4 mg, Oral, Q8H PRN **OR** ondansetron (ZOFRAN) injection 4 mg, 4 mg, IntraVENous, Q6H PRN  polyethylene glycol (GLYCOLAX) packet 17 g, 17 g, Oral, Daily PRN  acetaminophen (TYLENOL) tablet 650 mg, 650 mg, Oral, Q6H PRN **OR** acetaminophen (TYLENOL) suppository 650 mg, 650 mg, Rectal, Q6H PRN  potassium chloride (KLOR-CON M) extended release tablet 40 mEq, 40 mEq, Oral, PRN **OR** potassium bicarb-citric acid (EFFER-K) effervescent tablet 40 mEq, 40 mEq, Oral, PRN **OR** potassium chloride 10 mEq/100 mL IVPB (Peripheral Line), 10 mEq, IntraVENous, PRN  magnesium sulfate 2000 mg in 50 mL IVPB premix, 2,000 mg, IntraVENous, PRN  heparin (porcine) injection 5,000 Units, 5,000 Units, IntraVENous, PRN  heparin (porcine) injection 2,500 Units, 2,500 Units, IntraVENous, PRN  ipratropium-albuterol (DUONEB) nebulizer solution 1 ampule, 1 ampule, Inhalation, Q4H WA  perflutren lipid microspheres (DEFINITY) injection 1.5 mL, 1.5 mL, IntraVENous, ONCE PRN  mometasone-formoterol (DULERA) 200-5 MCG/ACT inhaler 2 puff, 2 puff, Inhalation, BID       Vitals :     Vitals:    03/13/23 0808   BP:    Pulse:    Resp:    Temp:    SpO2: 95%       I & O :       Intake/Output Summary (Last 24 hours) at 3/13/2023 0908  Last data filed at 3/13/2023 0305  Gross per 24 hour Intake 240 ml   Output 750 ml   Net -510 ml        Physical Examination :     General appearance: Alert oriented very pleasant   Respiratory: Comfortable on oxygen  Cardiovascular: NAD, Edema-  Abdomen: -Soft nontender no distention  Other relevant findings:-       LABS:     Recent Labs     03/12/23  1309 03/12/23  1919 03/13/23  0632   WBC 9.5 10.7 17.0*   HGB 14.5 15.1 14.7   HCT 44.1 46.5 45.4    322 379     Recent Labs     03/12/23  1309 03/13/23  0632   * 138   K 4.3 4.6   CL 98* 101   CO2 25 20*   BUN 12 13   CREATININE 0.8 0.9   GLUCOSE 93 113*        Thanks,   [facility]  [address], [facility]  Office: [phone]  Fax: [phone]

## 2023-03-13 NOTE — ED PROVIDER NOTES
[facility] ENCOUNTER        Pt Name: Malu Floyd  MRN: [id number]  [place] 1940  Date of evaluation: 3/12/2023  Provider: Gatito Houston PA-C  PCP: Jami Huitron MD  Note Started: 5:04 PM EDT 3/13/23      KAROLINA. I have evaluated this patient. My supervising physician was available for consultation. Attempted to staff this patient with my attending provider and discussed this patient with him at length. Patient was transported upstairs prior to attending provider being able to evaluate pt, Dr Kerline Kerns. CHIEF COMPLAINT       Chief Complaint   Patient presents with    Cough     Hx of pulmonary fibrosis. Cough for 1 month. 02 sat low 90% recently       HISTORY OF PRESENT ILLNESS: 1 or more Elements     History from : Patient    Limitations to history : None    Malu Floyd is a 80 y.o. female with a past medical history of pulmonary fibrosis, not chronically on home oxygen, degenerative disc disease presents to the ED for a 1 month history of shortness of breath which has been worsening. Associated with coughing patient has seen her pulmonologist in addition to her primary care doctor for the symptoms. She has not improvement and spite of treatment with a course of Levaquin and steroids in addition to numerous inhalers. reports having acute chest pain with inspiration. Nursing Notes were all reviewed and agreed with or any disagreements were addressed in the HPI. REVIEW OF SYSTEMS :      Review of Systems   Constitutional:  Negative for chills and fever. HENT: Negative. Negative for congestion, rhinorrhea, sinus pressure, sinus pain and sore throat. Eyes:  Negative for discharge, redness and visual disturbance. Respiratory:  Positive for cough, chest tightness and shortness of breath. Cardiovascular:  Negative for chest pain and palpitations. Gastrointestinal:  Negative for abdominal pain, constipation, diarrhea, nausea and vomiting. Genitourinary:  Negative for difficulty urinating, dysuria and frequency. Musculoskeletal: Negative. Skin: Negative. Neurological: Negative. Negative for dizziness, weakness, numbness and headaches. Psychiatric/Behavioral: Negative. All other systems reviewed and are negative. Positives and Pertinent negatives as per HPI. SURGICAL HISTORY     Past Surgical History:   Procedure Laterality Date    CHOLECYSTECTOMY      COLONOSCOPY  14    colon Bx,s/ esoph Bx    HYSTERECTOMY (CERVIX STATUS UNKNOWN)      TUBAL LIGATION      UPPER GASTROINTESTINAL ENDOSCOPY  12    bx    WISDOM TOOTH EXTRACTION         CURRENTMEDICATIONS       Current Discharge Medication List        CONTINUE these medications which have NOT CHANGED    Details   furosemide (LASIX) 20 MG tablet Take 20 mg by mouth daily. solifenacin (VESICARE) 10 MG tablet Take 10 mg by mouth daily. nitrofurantoin (MACRODANTIN) 50 MG capsule Take 50 mg by mouth nightly. FIBER PO Take  by mouth. Probiotic Product (PROBIOTIC DAILY PO) Take  by mouth. celecoxib (CELEBREX) 200 MG capsule Take 200 mg by mouth 2 times daily. Naphazoline-Pheniramine (EYE ALLERGY RELIEF OP) Apply  to eye. omeprazole (PRILOSEC) 20 MG capsule Take 2 capsules by mouth 2 times daily. Qty: 30 capsule, Refills: 3      Fluticasone-Salmeterol (ADVAIR HFA IN) Inhale  into the lungs. levothyroxine (SYNTHROID) 50 MCG tablet Take 50 mcg by mouth daily. Carboxymethylcellulose Sodium (REFRESH OP) Apply  to eye. ALLERGIES     Patient has no known allergies.     FAMILYHISTORY       Family History   Problem Relation Age of Onset    Heart Disease Mother     Heart Disease Father         SOCIAL HISTORY       Social History     Tobacco Use    Smoking status: Former     Types: Cigarettes     Quit date: 1968     Years since quittin.8    Smokeless tobacco: Never   Substance Use Topics    Alcohol use: Yes     Comment: mod    Drug use: No       SCREENINGS        Rayne Coma Scale  Eye Opening: Spontaneous  Best Verbal Response: Oriented  Best Motor Response: Obeys commands  [place] Coma Scale Score: 15                CIWA Assessment  BP: (!) 142/62  Heart Rate: (!) 107           PHYSICAL EXAM  1 or more Elements     ED Triage Vitals [03/12/23 1206]   BP Temp Temp Source Heart Rate Resp SpO2 Height Weight   (!) 183/77 97.8 °F (36.6 °C) Oral 93 22 97 % 5' 1\" (1.549 m) 187 lb (84.8 kg)       Physical Exam  Vitals and nursing note reviewed. Constitutional:       Appearance: Normal appearance. She is well-developed. She is not ill-appearing or diaphoretic. HENT:      Head: Normocephalic and atraumatic. Nose: Nose normal.      Mouth/Throat:      Mouth: Mucous membranes are moist.      Pharynx: No oropharyngeal exudate. Eyes:      General:         Right eye: No discharge. Left eye: No discharge. Extraocular Movements: Extraocular movements intact. Conjunctiva/sclera: Conjunctivae normal.      Pupils: Pupils are equal, round, and reactive to light. Cardiovascular:      Rate and Rhythm: Normal rate and regular rhythm. Heart sounds: Normal heart sounds. No murmur heard. No friction rub. No gallop. Pulmonary:      Effort: Pulmonary effort is normal. No respiratory distress. Breath sounds: Rhonchi present. No wheezing. Comments: Poor air movement, rhonchi throughout  Abdominal:      General: Bowel sounds are normal. There is no distension. Palpations: Abdomen is soft. Tenderness: There is no abdominal tenderness. Musculoskeletal:         General: Normal range of motion. Cervical back: Normal range of motion. Skin:     General: Skin is warm and dry. Capillary Refill: Capillary refill takes less than 2 seconds. Neurological:      General: No focal deficit present. Mental Status: She is alert.    Psychiatric:         Mood and Affect: Mood normal.         Behavior: Behavior normal.           DIAGNOSTIC RESULTS   LABS:    Labs Reviewed   CULTURE, URINE - Abnormal; Notable for the following components:       Result Value    Urine Culture, Routine   (*)     Value: <10,000 CFU/ml mixed skin/urogenital ovidio.  No further workup    Organism Enterococcus faecalis (*)     All other components within normal limits    Narrative:     ORDER#: [id number]                          ORDERED BY: MARIA R BAZZI  SOURCE: Urine Clean Catch                  COLLECTED:  03/12/23 14:01  ANTIBIOTICS AT [facility]ILLA.:                      RECEIVED :  03/13/23 02:47   COMPREHENSIVE METABOLIC PANEL W/ REFLEX TO MG FOR LOW K - Abnormal; Notable for the following components:    Sodium 134 (*)     Chloride 98 (*)     Calcium 11.2 (*)     All other components within normal limits   LACTATE, SEPSIS - Abnormal; Notable for the following components:    Lactic Acid, Sepsis 2.4 (*)     All other components within normal limits   LACTATE, SEPSIS - Abnormal; Notable for the following components:    Lactic Acid, Sepsis 2.0 (*)     All other components within normal limits   URINALYSIS WITH REFLEX TO CULTURE - Abnormal; Notable for the following components:    Blood, Urine TRACE-INTACT (*)     Leukocyte Esterase, Urine MODERATE (*)     All other components within normal limits   MICROSCOPIC URINALYSIS - Abnormal; Notable for the following components:    WBC, UA 21-50 (*)     Bacteria, UA Rare (*)     All other components within normal limits   COMPREHENSIVE METABOLIC PANEL W/ REFLEX TO MG FOR LOW K - Abnormal; Notable for the following components:    CO2 20 (*)     Anion Gap 17 (*)     Glucose 113 (*)     Calcium 12.1 (*)     All other components within normal limits    Narrative:     Alexander Amanda tel. [phone],  Chemistry results called to and read back by Juan Childers RN, 03/13/2023  07:27, by Beverly Gonzales   CBC WITH AUTO DIFFERENTIAL - Abnormal; Notable for the following components:    WBC 17.0 (*)     Neutrophils Absolute 14.7 (*)     All other components within normal limits   ANTI-XA, UNFRACTIONATED HEPARIN - Abnormal; Notable for the following components:    Anti-XA Unfrac Heparin 0.72 (*)     All other components within normal limits   APTT - Abnormal; Notable for the following components:    aPTT 118.3 (*)     All other components within normal limits    Narrative:     Edith Kuhn tel. [phone],  Coag results called to and read back by Azul Rousseau RN, 03/12/2023  23:17, by Elizabeth Alas   LACTIC ACID - Abnormal; Notable for the following components:    Lactic Acid 3.5 (*)     All other components within normal limits   CALCIUM, IONIZED - Abnormal; Notable for the following components:    pH, Paul 7.530 (*)     All other components within normal limits   POCT GLUCOSE - Abnormal; Notable for the following components:    POC Glucose 159 (*)     All other components within normal limits   CULTURE, BLOOD 1    Narrative:     ORDER#: [id number]                          ORDERED BY: MARIA R BAZZI  SOURCE: Blood No site given                COLLECTED:  03/12/23 13:09  ANTIBIOTICS AT MILLA.:                      RECEIVED :  03/13/23 02:47  If child <=2 yrs old please draw pediatric bottle. ~Blood Culture 1   CULTURE, BLOOD 2    Narrative:     ORDER#: [id number]                          ORDERED BY: MARIA R BAZZI  SOURCE: Blood No site given                COLLECTED:  03/12/23 13:09  ANTIBIOTICS AT [facility]ILLA.:                      RECEIVED :  03/13/23 02:47  If child <=2 yrs old please draw pediatric bottle. ~Blood Culture #2   CULTURE, RESPIRATORY   CULTURE WITH SMEAR, ACID FAST BACILLIUS   CBC WITH AUTO DIFFERENTIAL   BRAIN NATRIURETIC PEPTIDE   TROPONIN   PROCALCITONIN   APTT   TROPONIN   CBC   PROTIME-INR   TROPONIN   ANTI-XA, UNFRACTIONATED HEPARIN   HEMOGLOBIN A1C   ANTI-XA, UNFRACTIONATED HEPARIN   SPECIMEN REJECTION    Narrative:     CALL  Александр Easton [phone],  Rejected Test Name lacid /Called to:MARGOTH Phlebotomist, 03/13/2023 07:21, by  Uzair Starr   VITAMIN D 25 HYDROXY   ELECTROPHORESIS PROTEIN, SERUM    Narrative:     Collection has been rescheduled by Con Chau at 03/13/2023 09:27 Reason:   Draw at 1230 eith other labs  Collection has been rescheduled by [facility] at 03/13/2023 09:27 Reason: Pt   has a 12:30   KAPPA/LAMBDA QUANTITATIVE FREE LIGHT CHAINS, SERUM   PTH, INTACT   PTH-RELATED PEPTIDE       When ordered only abnormal lab results are displayed. All other labs were within normal range or not returned as of this dictation. EKG: When ordered, EKG's are interpreted by the Emergency Department Physician in the absence of a cardiologist.  Please see their note for interpretation of EKG. RADIOLOGY:   Non-plain film images such as CT, Ultrasound and MRI are read by the radiologist. Plain radiographic images are visualized and preliminarily interpreted by the ED Provider with the below findings:    Interpretation per the Radiologist below, if available at the time of this note:    CT CHEST PULMONARY EMBOLISM W CONTRAST   Final Result   Acute appeared pulmonary embolism in the anterior right upper lobe. Age-indeterminate thread-like pulmonary emboli in the right lower lobe. Although acute PE is not excluded, these may be subacute to chronic. Scattered peripheral ground-glass infiltrates within both lungs, greatest in   the upper lungs. These may relate to active interstitial fibrosis. However,   infectious etiologies should be considered as well, especially atypical   etiologies. Findings were discussed with Ciara Maza at 3:50 pm on 3/12/2023. XR CHEST PORTABLE   Final Result   1. No acute radiographic finding to account for patient's shortness of breath. 2. Questionable nodular density in the right mid lung. Consider CT chest for   further evaluation.          VL Extremity Venous Bilateral    (Results Pending)     XR CHEST PORTABLE    Result Date: 3/12/2023  EXAMINATION: ONE XRAY VIEW OF THE CHEST 3/12/2023 1:08 pm COMPARISON: May 26, 2009 HISTORY: ORDERING SYSTEM PROVIDED HISTORY: Shortness of Breath TECHNOLOGIST PROVIDED HISTORY: Reason for exam:->Shortness of Breath Reason for Exam: cough and sob for the past month FINDINGS: Surgical sutures are seen in the right upper lung. Questionable nodular density in the right mid lung. The cardiomediastinal silhouette is within normal range. Lungs are clear. There is no focal pulmonary consolidation, pleural effusion, pneumothorax, or evidence of airspace pulmonary edema. 1. No acute radiographic finding to account for patient's shortness of breath. 2. Questionable nodular density in the right mid lung. Consider CT chest for further evaluation. CT CHEST PULMONARY EMBOLISM W CONTRAST    Result Date: 3/12/2023  EXAMINATION: CTA OF THE CHEST 3/12/2023 12:17 pm TECHNIQUE: CTA of the chest was performed after the administration of intravenous contrast.  Multiplanar reformatted images are provided for review. MIP images are provided for review. Automated exposure control, iterative reconstruction, and/or weight based adjustment of the mA/kV was utilized to reduce the radiation dose to as low as reasonably achievable. COMPARISON: None. HISTORY: ORDERING SYSTEM PROVIDED HISTORY: r/o PE TECHNOLOGIST PROVIDED HISTORY: USE THIS ONE TO R/O PE Reason for exam:->r/o PE Decision Support Exception - unselect if not a suspected or confirmed emergency medical condition->Emergency Medical Condition (MA) Reason for Exam: worsening productive cough x 5 wks-sob Additional signs and symptoms: hx-pul fibrosis-non-smoker Relevant Medical/Surgical History: no surg FINDINGS: Pulmonary Arteries: The pulmonary arteries are adequately opacified. There are thread-like nonocclusive filling defects in the right lower lobe pulmonary arteries. Additional pulmonary embolism is seen within the right upper lobe anterior segmental pulmonary artery within non-opacification of the distal vessel.  No convincing evidence of right heart strain. Mediastinum: Visualized thyroid unremarkable. No mediastinal or lymphadenopathy. Esophagus unremarkable. Cardiac chambers unremarkable. No pericardial effusion. Thoracic aorta is normal in caliber without evidence of dissection. Lungs/pleura: Postsurgical changes seen within the posterior right upper lobe. Peripheral fibrosis found bilaterally. Scattered peripheral ground-glass infiltrates are see, greatest in the upper lungs. Diffuse bronchial wall thickening noted. Upper Abdomen: Limited images of the upper abdomen are unremarkable. Soft Tissues/Bones: No acute bone or soft tissue abnormality. Acute appeared pulmonary embolism in the anterior right upper lobe. Age-indeterminate thread-like pulmonary emboli in the right lower lobe. Although acute PE is not excluded, these may be subacute to chronic. Scattered peripheral ground-glass infiltrates within both lungs, greatest in the upper lungs. These may relate to active interstitial fibrosis. However, infectious etiologies should be considered as well, especially atypical etiologies. Findings were discussed with Giulia Davis at 3:50 pm on 3/12/2023. No results found. CRITICAL CARE TIME (.cctime)   Because of high probability of sudden clinical deterioration of the patient's condition and risk of further deterioration, critical care time required my full attention to the patient's condition; which included chart data review, documentation, medication ordering, reviewing the patient's old records, reevaluation patient's cardiac, pulmonary and neurological status. Reevaluation of vital signs. Consultations with ED attending and admitting physician. Ordering, interpreting reviewing diagnostic testing.   Therefore, I personally saw the patient and independently provided 45 minutes of non-concurrent critical care out of the total shared critical care time provided, direct attention to the patient's condition did not include time spent on procedures. PAST MEDICAL HISTORY      has a past medical history of Allergic rhinitis, mild, Arthritis, Asthma, Bloating, Cancer ([address] Swallowing difficulty, and Thyroid disease.      Chronic Conditions affecting Care: above    EMERGENCY DEPARTMENT COURSE and DIFFERENTIAL DIAGNOSIS/MDM:   Vitals:    Vitals:    03/13/23 1130 03/13/23 1206 03/13/23 1602 03/13/23 1615   BP: (!) 150/70   (!) 142/62   Pulse: (!) 102  100 (!) 107   Resp: 18  18 18   Temp: 97.5 °F (36.4 °C)   97.4 °F (36.3 °C)   TempSrc: Oral   Oral   SpO2: 96% 95% 97% 96%   Weight:       Height:           Patient was given the following medications:  Medications   heparin 25,000 units in dextrose 5% 250 mL (premix) infusion (1,070 Units/hr IntraVENous Rate/Dose Verify 3/13/23 1620)   carboxymethylcellulose PF (THERATEARS) 1 % ophthalmic gel 2 drop (has no administration in time range)   furosemide (LASIX) tablet 20 mg (20 mg Oral Given 3/13/23 0933)   pantoprazole (PROTONIX) tablet 40 mg (40 mg Oral Given 3/13/23 1619)   levothyroxine (SYNTHROID) tablet 50 mcg (50 mcg Oral Given 3/13/23 0639)   piperacillin-tazobactam (ZOSYN) 3,375 mg in sodium chloride 0.9 % 50 mL IVPB (mini-bag) ( IntraVENous Rate/Dose Verify 3/13/23 1620)   sodium chloride flush 0.9 % injection 5-40 mL (10 mLs IntraVENous Given 3/13/23 0934)   sodium chloride flush 0.9 % injection 5-40 mL (has no administration in time range)   0.9 % sodium chloride infusion (has no administration in time range)   ondansetron (ZOFRAN-ODT) disintegrating tablet 4 mg (has no administration in time range)     Or   ondansetron (ZOFRAN) injection 4 mg (has no administration in time range)   polyethylene glycol (GLYCOLAX) packet 17 g (has no administration in time range)   acetaminophen (TYLENOL) tablet 650 mg (650 mg Oral Given 3/13/23 0307)     Or   acetaminophen (TYLENOL) suppository 650 mg ( Rectal See Alternative 3/13/23 0307)   potassium chloride (KLOR-CON M) extended release tablet 40 mEq (has no administration in time range)     Or   potassium bicarb-citric acid (EFFER-K) effervescent tablet 40 mEq (has no administration in time range)     Or   potassium chloride 10 mEq/100 mL IVPB (Peripheral Line) (has no administration in time range)   magnesium sulfate 2000 mg in 50 mL IVPB premix (has no administration in time range)   heparin (porcine) injection 5,000 Units (has no administration in time range)   heparin (porcine) injection 2,500 Units (has no administration in time range)   ipratropium-albuterol (DUONEB) nebulizer solution 1 ampule (1 ampule Inhalation Given 3/13/23 1602)   perflutren lipid microspheres (DEFINITY) injection 1.5 mL (has no administration in time range)   mometasone-formoterol (DULERA) 200-5 MCG/ACT inhaler 2 puff (2 puffs Inhalation Given 3/13/23 0808)   benzocaine-menthol (CEPACOL SORE THROAT) lozenge 1 lozenge (1 lozenge Oral Given 3/13/23 0639)   glucose chewable tablet 16 g (has no administration in time range)   dextrose bolus 10% 125 mL (has no administration in time range)     Or   dextrose bolus 10% 250 mL (has no administration in time range)   glucagon (rDNA) injection 1 mg (has no administration in time range)   dextrose 10 % infusion (has no administration in time range)   0.9 % sodium chloride infusion ( IntraVENous Stopped 3/13/23 1509)   ipratropium-albuterol (DUONEB) nebulizer solution 1 ampule (1 ampule Inhalation Given 3/12/23 1317)   methylPREDNISolone sodium (SOLU-MEDROL) injection 125 mg (125 mg IntraVENous Given 3/12/23 1322)   0.9 % sodium chloride bolus (0 mLs IntraVENous Stopped 3/12/23 1608)   cefTRIAXone (ROCEPHIN) 1,000 mg in sodium chloride 0.9 % 50 mL IVPB (mini-bag) (0 mg IntraVENous Stopped 3/12/23 1610)   iopamidol (ISOVUE-370) 76 % injection 75 mL (75 mLs IntraVENous Given 3/12/23 1528)   heparin (porcine) injection 5,000 Units (5,000 Units IntraVENous Given 3/12/23 1652)             Is this patient to be included in the SEP-1 Core Measure due to severe sepsis or septic shock? No   Exclusion criteria - the patient is NOT to be included for SEP-1 Core Measure due to:  2+ SIRS criteria are not met    CONSULTS:   IP CONSULT TO HOSPITALIST  IP CONSULT TO NEPHROLOGY  IP CONSULT TO PULMONOLOGY    CC/HPI Summary, DDx, ED Course, and Reassessment:   Patient presents in moderate distress very frustrated as she is continued to have cough and shortness of breath and spite of numerous treatments reports having home oxygen levels in the low 90's. On arrival patient's hypertensive oxygen saturation is 96, 97% on room air and tachypneic. Her lung examination with very poor air movement and very junky sounding. I ordered a breathing treatment/steroids. Patient is not initially tachycardic however becomes tachycardic with movements and exertion. Evaluated notes from the last couple of visits from pulmonology. The patient is by medications her son is at bedside. She is living at home. EKG is sinus rhythm with PVCs. Chest x-ray is concerning for possible density to patient's right lung. So therefore we ordered a CT PE study. Although patient had a CT of her chest several weeks ago at her pulmonologist it was not performed with contrast so I do feel it is warranted discussed this with patient and her son. CT PE study is discussed with radiologist who indicates patient has an acute and also subacute pulmonary embolism. Went back to bedside and discussed this with the patient she has no history of known blood clots. She is not taking any anticoagulations. I discussed that we will need to initiate heparin we discussed the risk versus the benefits of it and we will initiate this in the ER. Consult to the hospitalist who accepts the patient in stable condition. I am the Primary Clinician of Record. FINAL IMPRESSION      1. Acute pulmonary embolism without acute cor pulmonale, unspecified pulmonary embolism type ([address]    2.  Shortness of breath          DISPOSITION/PLAN     DISPOSITION Admitted 03/12/2023 05:26:20 PM      PATIENT REFERRED TO:  No follow-up provider specified.     DISCHARGE MEDICATIONS:  Current Discharge Medication List          DISCONTINUED MEDICATIONS:  Current Discharge Medication List                 (Please note that portions of this note were completed with a voice recognition program.  Efforts were made to edit the dictations but occasionally words are mis-transcribed.)    Hernán Salcedo PA-C (electronically signed)           Hernán Salcedo PA-C  03/13/23 [id number]

## 2023-03-13 NOTE — CARE COORDINATION
Case Management Assessment  Initial Evaluation    Date/Time of Evaluation: 3/13/2023 1:35 PM  Assessment Completed by: Maximiliano Vazquez RN    If patient is discharged prior to next notation, then this note serves as note for discharge by case management. Patient Name: Jerardo Oquendo                   YOB: 1940  Diagnosis: Shortness of breath [R06.02]  Acute pulmonary embolism without acute cor pulmonale, unspecified pulmonary embolism type ([facility] [address] [I26.99]                   Date / Time: 3/12/2023 11:57 AM    Patient Admission Status: Inpatient   Readmission Risk (Low < 19, Mod (19-27), High > 27): Readmission Risk Score: 10.7    Current PCP: Moreno Cruz MD  PCP verified by CM? Yes    Chart Reviewed: Yes      History Provided by: Patient, Child/Family  Patient Orientation: Alert and Oriented, Person, Place, Situation, Self    Patient Cognition: Alert    Hospitalization in the last 30 days (Readmission):  No    If yes, Readmission Assessment in CM Navigator will be completed. Advance Directives:      Code Status: Full Code   Patient's Primary Decision Maker is: Legal Next of Kin    Primary Decision MakerKaylene Silva - Child - [phone]    Secondary Decision Maker: Maryann Zapien - Child - [phone]    Supplemental (Other) Decision Maker: Abdifatah Field Child - [phone]    Discharge Planning:    Patient lives with: Family Members Type of Home: House  Primary Care Giver: Self  Patient Support Systems include: Family Members   Current Financial resources: Medicare  Current community resources: Housing  Current services prior to admission: None (has rescue INH and nebulizer at home)            Current DME:              Type of Home Care services:  None    ADLS  Prior functional level: Independent in ADLs/IADLs  Current functional level: Independent in ADLs/IADLs    PT AM-PAC: 21 /24  OT AM-PAC: 25 /24    Family can provide assistance at DC:  Yes  Would you like Case Management to discuss the discharge plan with any other family members/significant others, and if so, who? Yes (children, Teresa or César Washington)  Plans to Return to Present Housing: Unknown at present  Other Identified Issues/Barriers to RETURNING to current housing: none  Potential Assistance needed at discharge: N/A            Potential DME:    Patient expects to discharge to: [address] [place] [address] for transportation at discharge: Family    Financial    Payor: Irene Covert / Plan: MEDICARE PART A AND B / Product Type: *No Product type* /     Does insurance require precert for SNF: No    Potential assistance Purchasing Medications: No  [facility] [id number] [address] [phone] [facility] [phone]  [address] [place] [address]  Phone: [phone] Fax: [phone]      Notes:    Factors facilitating achievement of predicted outcomes: Family support, Motivated, Cooperative, Pleasant, Sense of humor, Good insight into deficits, and Has needed Durable Medical Equipment at home    Barriers to discharge: Medical complications    Additional Case Management Notes: Acute PE. Management per pulmonology, nephro and IM. BLE dopplers ordered. ECHO pending. Heparin gtt. IVF for hypercalcemia. Pt from home with son. IPTA. PTOT rec home with assist PRN/supervision. CM will follow, should needs arise. Evita Short RN     The Plan for Transition of Care is related to the following treatment goals of Shortness of breath [R06.02]  Acute pulmonary embolism without acute cor pulmonale, unspecified pulmonary embolism type ([address] [W44.47]    IF APPLICABLE: The Patient and/or patient representative [facility] and her family were provided with a choice of provider and agrees with the discharge plan.  Freedom of choice list with basic dialogue that supports the patient's individualized plan of care/goals and shares the quality data associated with the providers was provided to:     Patient Representative Name:       The Patient and/or Patient Representative Agree with the Discharge Plan?       Yuko Claudio RN  Case Management Department  Ph: [phone] Fax: [phone]

## 2023-03-13 NOTE — PLAN OF CARE
Problem: Respiratory - Adult  Goal: Achieves optimal ventilation and oxygenation  Outcome: Progressing  Flowsheets (Taken 3/12/2023 2119)  Achieves optimal ventilation and oxygenation:   Assess for changes in respiratory status   Assess for changes in mentation and behavior   Respiratory therapy support as indicated   Assess and instruct to report shortness of breath or any respiratory difficulty

## 2023-03-13 NOTE — PROGRESS NOTES
Occupational Therapy  Facility/Department: [place] [id number] - MED SURG  Occupational Therapy Initial/discharge  Assessment  1 x only       Name: Atif Andujar  : 1940  MRN: [id number]  Date of Service: 3/13/2023    Discharge Recommendations:  24 hour supervision or assist (initially)          Patient Diagnosis(es): The primary encounter diagnosis was Acute pulmonary embolism without acute cor pulmonale, unspecified pulmonary embolism type ([facility] [address] A diagnosis of Shortness of breath was also pertinent to this visit. Past Medical History:  has a past medical history of Allergic rhinitis, mild, Arthritis, Asthma, Bloating, Cancer ([address] Swallowing difficulty, and Thyroid disease. Past Surgical History:  has a past surgical history that includes Cholecystectomy; Hysterectomy; Tubal ligation; [place] tooth extraction; Upper gastrointestinal endoscopy (12); and Colonoscopy (14).            Assessment      No Skilled OT: Independent with ADL's  REQUIRES OT FOLLOW-UP: No  Activity Tolerance  Activity Tolerance: Patient Tolerated treatment well  Activity Tolerance Comments: sitting in chair on RA after bathroom mobility:  BP = 155/84, HR = 124, 93-97 % O 2 sats;        Plan   Occupational Therapy Plan  Times Per Week: OT eval only     Restrictions  Restrictions/Precautions  Restrictions/Precautions: Bedrest with Bathroom Privileges, Fall Risk  Required Braces or Orthoses?: No  Position Activity Restriction  Other position/activity restrictions: IV    Subjective   General  Chart Reviewed: Yes  Patient assessed for rehabilitation services?: Yes  Family / Caregiver Present: Yes (son & daughter-in-law)  Referring Practitioner: Dr. Rin Griggs  Diagnosis: SOB, PT  Subjective  Subjective: requesting to go to bathroom  General Comment  Comments: RN cleared pt for OT eval; pt awake in bed, agreeable to therapy  denies  Social/Functional History  Social/Functional History  Lives With: Son (son works during the day)  Type of Home: House  Home Layout: One level, Laundry in basement  Home Access: Stairs to enter without rails  Entrance Stairs - Number of Steps: 2  Bathroom Shower/Tub: Tub/Shower unit  Bathroom Equipment: Grab bars in shower, Shower chair  Home Equipment: [employer]  Has the patient had two or more falls in the past year or any fall with injury in the past year?: No  ADL Assistance: Independent  Homemaking Assistance: Independent (shares chores with son)  Ambulation Assistance: Independent (without AD)  Transfer Assistance: Independent  Active : Yes  Occupation: Retired       Objective                Safety Devices  Type of Devices: Call light within reach;Gait belt;Nurse notified; Bed alarm in place; Patient at risk for falls  Restraints  Restraints Initially in Place: No     Toilet Transfers  Toilet - Technique: Ambulating (supervision without AD due to IV pole)  Toilet Transfer: Independent    AROM: Within functional limits  Strength: Within functional limits  Coordination: Within functional limits  Tone: Normal    ADL  Feeding: Independent  Grooming: Supervision  LE Dressing: Independent  Toileting: Independent     Activity Tolerance  Activity Tolerance: Patient tolerated treatment well     Transfers  Sit to stand: Supervision  Stand to sit: Supervision    Vision  Vision: Within Functional Limits    Hearing  Hearing: Exceptions to [facility]  Hearing Exceptions: Bilateral hearing aid    Cognition  Overall Cognitive Status: Exceptions (impulsive, very [place])  Arousal/Alertness: Delayed responses to stimuli (very [place])  Following Commands:  Follows one step commands with repetition (very [place])  Attention Span: Attends with cues to redirect  Memory: Decreased recall of precautions  Safety Judgement: Decreased awareness of need for safety;Decreased awareness of need for assistance  Initiation: Does not require cues (very impulsive)  Sequencing: Does not require cues  Orientation  Overall Orientation Status: Within Normal Limits Education Given To: Patient  Education Provided: Role of Therapy;Plan of Care;Precautions  Education Provided Comments: disease specific: importance of use of RED/nurse call light for assist with transfers;  Education Method: Verbal;Demonstration  Barriers to Learning: Hearing  Education Outcome: Demonstrated understanding          AM-PAC Score        AM-PAC Inpatient Daily Activity Raw Score: 22 (03/13/23 1116)  AM-PAC Inpatient ADL T-Scale Score : 47.1 (03/13/23 1116)  ADL Inpatient CMS 0-100% Score: 25.8 (03/13/23 1116)  ADL Inpatient CMS G-Code Modifier : CJ (03/13/23 1116)    Tinneti Score       Goals  Patient Goals   Patient goals : \"go home\"       Therapy Time   Individual Concurrent Group Co-treatment   Time In [facility]         Time Out 0910         Minutes [address] [place] [address]

## 2023-03-13 NOTE — CONSULTS
INPATIENT PULMONARY CRITICAL CARE CONSULT NOTE      Chief Complaint/Referring Provider:  Patient is being seen at the request of Dr. Phan Cruz for a consultation for: numerous pulmonary emboli, ground glass changes     Presenting HPI: Lili is a very pleasant 82-year-old female living in [place].  She was in [place] and in [place] before moving to [place].  Her  was recently placed in a facility for progressive dementia.  The patient was a very light smoker more than 50 years ago.  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  The biopsy was sent to Dr. Garcia at [facility] for second opinion.  She had been on OFEV, was maintained on 1 tablet daily due to intolerance.  She recently saw her pulmonologist Dr. Davalos in February, due to progression of disease OFEV was increased to twice daily.  She was seen by pulmonology at [facility] in the past, had nonspecific positive ANCA.  The patient is not a very good historian, but mentions that she has had diarrhea on and off, takes some diarrhea pills for it.  Possible asthma and has allergic rhinitis seasonally initially, now perennial.  She does have cough with yellowish phlegm, denies hemoptysis.  He describes progressively increasing dyspnea over the last several months, particularly in the last few weeks.  She has GERD but no dysphagia.  The patient has had PFT at [facility] in November 2022.  She had HRCT chest 2/15/2023 at [facility].  The patient had been on Breo, DuoNeb, Flonase and montelukast.  The patient has recurrent UTIs, says she does not know when she has symptoms.  She has had diarrhea on and off.  She says her weight fluctuates, she has not had consistent weight loss.  He says she could not tolerate CPAP and has not used it for years.  She does have lumbar spinal stenosis, has back pain.  She has had skin cancer.    The patient presented to the ER on 3/12/2023 for increasing shortness of  breath and cough. She noted low pulse oximeter readings, which was not usual for her. She did endorse intermittent chest pain, possibly pleuritic. She does have occasional left leg edema. There is no prior history of thromboembolic disease. Work-up in the ER showed acute PE, scattered groundglass opacities bilaterally. She had mild elevation of serum calcium and procalcitonin. Blood cultures were obtained and she was placed on Rocephin and Zithromax. She was also given Solu-Medrol. Reportedly she did have wheezing on presentation, was placed on DuoNeb. She was placed on heparin infusion. We are consulted for abnormal lung imaging.      Patient Active Problem List    Diagnosis Date Noted    Acute pulmonary embolism ([facility] [address] 03/12/2023    Bilateral pneumonia 03/12/2023    IPF (idiopathic pulmonary fibrosis) ([facility] [address] 03/12/2023    Bronchiectasis with acute lower respiratory infection ([address] 03/12/2023    Acute pulmonary embolism without acute cor pulmonale, unspecified pulmonary embolism type ([address] 03/12/2023    Lumbar facet arthropathy 05/24/2018    Spinal stenosis of lumbar region 05/24/2018    Disc displacement, lumbar 05/24/2018    Degeneration of lumbar or lumbosacral intervertebral disc 03/05/2018    Displacement of lumbar intervertebral disc without myelopathy 03/05/2018    Lumbosacral spondylosis without myelopathy 03/05/2018    Spinal stenosis, lumbar region, without neurogenic claudication 03/05/2018       Past Medical History:   Diagnosis Date    Allergic rhinitis, mild     Arthritis     Asthma     Bloating     Cancer (HCC)     skin    Swallowing difficulty     Thyroid disease         Past Surgical History:   Procedure Laterality Date    CHOLECYSTECTOMY      COLONOSCOPY  7/24/14    colon Bx,s/ esoph Bx    HYSTERECTOMY (CERVIX STATUS UNKNOWN)      TUBAL LIGATION      UPPER GASTROINTESTINAL ENDOSCOPY  5/22/12    bx    WISDOM TOOTH EXTRACTION          Family History   Problem Relation Age of Onset    Heart Disease Mother     Heart Disease Father         Social History     Tobacco Use    Smoking status: Former     Types: Cigarettes     Quit date: 1968     Years since quittin.8    Smokeless tobacco: Never   Substance Use Topics    Alcohol use: Yes     Comment: mod        No Known Allergies    Physical Exam:  Blood pressure 136/65, pulse 98, temperature 97.9 °F (36.6 °C), temperature source Oral, resp. rate 18, height 5' 1\" (1.549 m), weight 189 lb 4.8 oz (85.9 kg), SpO2 95 %.'   Constitutional: Very pleasant, looks good for her stated age. In no acute distress. Overweight  HENT:  Oropharynx is clear and moist.  Class III airway. Hard of hearing, hearing aids  Eyes:  Conjunctivae are normal.No scleral icterus. Neck: Relatively short and large neck, no JVD. No tracheal deviation present. No obvious thyroid mass. Cardiovascular: Normal rate, regular rhythm, normal heart sounds. No right ventricular heave. No lower extremity edema. Pulmonary/Chest: No wheezes. No rales. No accessory muscle usage or stridor. Abdominal: Soft, mildly protuberant. Fatty abdominal wall. No distension or tenderness. Musculoskeletal: No cyanosis. No clubbing. No obvious joint deformity. Lymphadenopathy: No cervical or supraclavicular adenopathy. Skin: Skin is warm and dry. No rash or nodules on the exposed extremities. Psychiatric: Normal mood and affect. Behavior is normal.  No anxiety. Neurologic: Alert, awake and oriented. PERRL. Speech fluent.   No obvious neurologic deficit, detailed exam not performed      Results:  CBC:   Recent Labs     23  1309 23  1919 23  0632   WBC 9.5 10.7 17.0*   HGB 14.5 15.1 14.7   HCT 44.1 46.5 45.4   MCV 94.6 95.6 96.9    322 379     BMP:   Recent Labs     23  1309 23  0632   * 138   K 4.3 4.6   CL 98* 101   CO2 25 20*   BUN 12 13   CREATININE 0.8 0.9     LIVER PROFILE:   Recent Labs     23  1309 23  0632   AST 25 23   ALT 20 21   BILITOT 0.4 0.4   ALKPHOS 92 96     PT/INR:   Recent Labs     03/12/23  1919   PROTIME 14.4   INR 1.13     APTT:   Recent Labs     03/12/23  1307 03/12/23  2237   APTT 30.0 118.3*     UA:  Recent Labs     03/12/23  1401   COLORU Yellow   PHUR 6.5   WBCUA 21-50*   RBCUA 3-4   BACTERIA Rare*   CLARITYU Clear   SPECGRAV 1.010   LEUKOCYTESUR MODERATE*   UROBILINOGEN 0.2   BILIRUBINUR Negative   BLOODU TRACE-INTACT*   GLUCOSEU Negative       Imaging:  I have reviewed radiology images personally. CT CHEST PULMONARY EMBOLISM W CONTRAST   Final Result   Acute appeared pulmonary embolism in the anterior right upper lobe. Age-indeterminate thread-like pulmonary emboli in the right lower lobe. Although acute PE is not excluded, these may be subacute to chronic. Scattered peripheral ground-glass infiltrates within both lungs, greatest in   the upper lungs. These may relate to active interstitial fibrosis. However,   infectious etiologies should be considered as well, especially atypical   etiologies. Findings were discussed with Chai Guerrero at 3:50 pm on 3/12/2023. XR CHEST PORTABLE   Final Result   1. No acute radiographic finding to account for patient's shortness of breath. 2. Questionable nodular density in the right mid lung. Consider CT chest for   further evaluation. VL Extremity Venous Bilateral    (Results Pending)     XR CHEST PORTABLE    Result Date: 3/12/2023  EXAMINATION: ONE XRAY VIEW OF THE CHEST 3/12/2023 1:08 pm COMPARISON: May 26, 2009 HISTORY: ORDERING SYSTEM PROVIDED HISTORY: Shortness of Breath TECHNOLOGIST PROVIDED HISTORY: Reason for exam:->Shortness of Breath Reason for Exam: cough and sob for the past month FINDINGS: Surgical sutures are seen in the right upper lung. Questionable nodular density in the right mid lung. The cardiomediastinal silhouette is within normal range. Lungs are clear.  There is no focal pulmonary consolidation, pleural effusion, pneumothorax, or evidence of airspace pulmonary edema. 1. No acute radiographic finding to account for patient's shortness of breath. 2. Questionable nodular density in the right mid lung. Consider CT chest for further evaluation. CT CHEST PULMONARY EMBOLISM W CONTRAST    Result Date: 3/12/2023  EXAMINATION: CTA OF THE CHEST 3/12/2023 12:17 pm TECHNIQUE: CTA of the chest was performed after the administration of intravenous contrast.  Multiplanar reformatted images are provided for review. MIP images are provided for review. Automated exposure control, iterative reconstruction, and/or weight based adjustment of the mA/kV was utilized to reduce the radiation dose to as low as reasonably achievable. COMPARISON: None. HISTORY: ORDERING SYSTEM PROVIDED HISTORY: r/o PE TECHNOLOGIST PROVIDED HISTORY: USE THIS ONE TO R/O PE Reason for exam:->r/o PE Decision Support Exception - unselect if not a suspected or confirmed emergency medical condition->Emergency Medical Condition ([place]) Reason for Exam: worsening productive cough x 5 wks-sob Additional signs and symptoms: hx-pul fibrosis-non-smoker Relevant Medical/Surgical History: no surg FINDINGS: Pulmonary Arteries: The pulmonary arteries are adequately opacified. There are thread-like nonocclusive filling defects in the right lower lobe pulmonary arteries. Additional pulmonary embolism is seen within the right upper lobe anterior segmental pulmonary artery within non-opacification of the distal vessel. No convincing evidence of right heart strain. Mediastinum: Visualized thyroid unremarkable. No mediastinal or lymphadenopathy. Esophagus unremarkable. Cardiac chambers unremarkable. No pericardial effusion. Thoracic aorta is normal in caliber without evidence of dissection. Lungs/pleura: Postsurgical changes seen within the posterior right upper lobe. Peripheral fibrosis found bilaterally.   Scattered peripheral ground-glass infiltrates are see, greatest in the upper lungs. Diffuse bronchial wall thickening noted. Upper Abdomen: Limited images of the upper abdomen are unremarkable. Soft Tissues/Bones: No acute bone or soft tissue abnormality. Acute appeared pulmonary embolism in the anterior right upper lobe. Age-indeterminate thread-like pulmonary emboli in the right lower lobe. Although acute PE is not excluded, these may be subacute to chronic. Scattered peripheral ground-glass infiltrates within both lungs, greatest in the upper lungs. These may relate to active interstitial fibrosis. However, infectious etiologies should be considered as well, especially atypical etiologies. Findings were discussed with Blair Trujillo at 3:50 pm on 3/12/2023. Echocardiogram: Pending  PFT: None in [address] and plan:  Acute pulmonary embolism without acute cor pulmonale, unspecified pulmonary embolism type ([address] CT images reviewed, the patient does have a small clot in the right lower lobe vessel, possible right upper lobe anterior clot. Agree with anticoagulation, can change to oral therapy with NOAC. Small clot burden unlikely to cause pulmonary hypertension  IPF (idiopathic pulmonary fibrosis) ([address] UIP confirmed by open lung biopsy in December 2019. Differential HP with fibrosis was raised on biopsy due to upper lobe involvement and minimal honeycombing. On OFEV as outpatient, dose increased recently. Would consider not prescribing  nitrofurantoin, started possibly in 7/24/2014. Acute, subacute and chronic lung injury can occur with it. Pharmacy noted-the patient has been on it since March 2020, having received a 7-day course in March 2020, multiple 1 week courses in May, June 2022 for 1 week each, a 10-day course in August 2022 and a 90-day supply on September 13, 2022. The patient's son notes that her medication closet, Macrodantin was not there. ? Hypoxia. Walking oximetry. Exercise desaturation common in ILD.   Bronchiectasis with acute lower respiratory infection ([address] Mild bronchiectasis. Likely related to pulmonary fibrosis. Bilateral pneumonia. CT likely suggests part of the ILD picture rather than pneumonia. Mild lactic acidosis, mildly elevated anion gap. Blood cultures pending. No elevation in white count or procalcitonin. Would certainly discontinue Levaquin, continue Rocephin for now. Will obtain sputum culture and sputum for AFB to rule out NTM infection. Hypercalcemia. Nephrology consulted. 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 Sarcoidosis unlikely based on imaging findings and biopsy findings without granulomas and giant cells  Leukocytosis. Probably due to steroids. TIEN. Not compliant with CPAP  DJD, hypothyroidism, spinal stenosis. Per IM  Obesity. Caloric restriction and increased activity  DVT prophylaxis. Currently on heparin infusion    I have examined the patient, reviewed labs, images and medical records. My impression and recommendations are as above. Discussed with the patient, her son and nursing. We will follow with you, thank you for the consultation.         Electronically signed by:  Katherine Najjar, MD    3/13/2023    10:28 AM.

## 2023-03-13 NOTE — RT PROTOCOL NOTE
RT Inhaler-Nebulizer Bronchodilator Protocol Note    There is a bronchodilator order in the chart from a provider indicating to follow the RT Bronchodilator Protocol and there is an Initiate RT Inhaler-Nebulizer Bronchodilator Protocol order as well (see protocol at bottom of note). CXR Findings:  XR CHEST PORTABLE    Result Date: 3/12/2023  1. No acute radiographic finding to account for patient's shortness of breath. 2. Questionable nodular density in the right mid lung. Consider CT chest for further evaluation. The findings from the last RT Protocol Assessment were as follows:   History Pulmonary Disease: Smoker 15 pack years or more  Respiratory Pattern: Mild dyspnea at rest, irregular pattern, or RR 21-25 bpm  Breath Sounds: Inspiratory and expiratory or bilateral wheezing and/or rhonchi  Cough: Strong, productive  Indication for Bronchodilator Therapy: On home bronchodilators  Bronchodilator Assessment Score: 12    Aerosolized bronchodilator medication orders have been revised according to the RT Inhaler-Nebulizer Bronchodilator Protocol below. Respiratory Therapist to perform RT Therapy Protocol Assessment initially then follow the protocol. Repeat RT Therapy Protocol Assessment PRN for score 0-3 or on second treatment, BID, and PRN for scores above 3. No Indications - adjust the frequency to every 6 hours PRN wheezing or bronchospasm, if no treatments needed after 48 hours then discontinue using Per Protocol order mode. If indication present, adjust the RT bronchodilator orders based on the Bronchodilator Assessment Score as indicated below. Use Inhaler orders unless patient has one or more of the following: on home nebulizer, not able to hold breath for 10 seconds, is not alert and oriented, cannot activate and use MDI correctly, or respiratory rate 25 breaths per minute or more, then use the equivalent nebulizer order(s) with same Frequency and PRN reasons based on the score.   If a patient is on this medication at home then do not decrease Frequency below that used at home. 0-3 - enter or revise RT bronchodilator order(s) to equivalent RT Bronchodilator order with Frequency of every 4 hours PRN for wheezing or increased work of breathing using Per Protocol order mode. 4-6 - enter or revise RT Bronchodilator order(s) to two equivalent RT bronchodilator orders with one order with BID Frequency and one order with Frequency of every 4 hours PRN wheezing or increased work of breathing using Per Protocol order mode. 7-10 - enter or revise RT Bronchodilator order(s) to two equivalent RT bronchodilator orders with one order with TID Frequency and one order with Frequency of every 4 hours PRN wheezing or increased work of breathing using Per Protocol order mode. 11-13 - enter or revise RT Bronchodilator order(s) to one equivalent RT bronchodilator order with QID Frequency and an Albuterol order with Frequency of every 4 hours PRN wheezing or increased work of breathing using Per Protocol order mode. Greater than 13 - enter or revise RT Bronchodilator order(s) to one equivalent RT bronchodilator order with every 4 hours Frequency and an Albuterol order with Frequency of every 2 hours PRN wheezing or increased work of breathing using Per Protocol order mode. RT to enter RT Home Evaluation for COPD & MDI Assessment order using Per Protocol order mode.     Electronically signed by Karin Srivastava RCP on 3/12/2023 at 8:03 PM

## 2023-03-14 LAB
ANION GAP SERPL CALCULATED.3IONS-SCNC: 11 MMOL/L (ref 3–16)
ANTI-XA UNFRAC HEPARIN: 0.42 IU/ML (ref 0.3–0.7)
BACTERIA UR CULT: ABNORMAL
BACTERIA UR CULT: ABNORMAL
BUN SERPL-MCNC: 20 MG/DL (ref 7–20)
CALCIUM SERPL-MCNC: 11.4 MG/DL (ref 8.3–10.6)
CHLORIDE SERPL-SCNC: 103 MMOL/L (ref 99–110)
CO2 SERPL-SCNC: 26 MMOL/L (ref 21–32)
CREAT SERPL-MCNC: 1.1 MG/DL (ref 0.6–1.2)
DEPRECATED RDW RBC AUTO: 14.7 % (ref 12.4–15.4)
GFR SERPLBLD CREATININE-BSD FMLA CKD-EPI: 50 ML/MIN/{1.73_M2}
GLUCOSE SERPL-MCNC: 85 MG/DL (ref 70–99)
HCT VFR BLD AUTO: 40.8 % (ref 36–48)
HGB BLD-MCNC: 13.4 G/DL (ref 12–16)
KAPPA LC FREE SER-MCNC: 10.77 MG/L (ref 3.3–19.4)
KAPPA LC FREE/LAMBDA FREE SER: 0.87 {RATIO} (ref 0.26–1.65)
LAMBDA LC FREE SERPL-MCNC: 12.37 MG/L (ref 5.71–26.3)
MAGNESIUM SERPL-MCNC: 1.8 MG/DL (ref 1.8–2.4)
MCH RBC QN AUTO: 30.8 PG (ref 26–34)
MCHC RBC AUTO-ENTMCNC: 32.7 G/DL (ref 31–36)
MCV RBC AUTO: 93.9 FL (ref 80–100)
ORGANISM: ABNORMAL
PLATELET # BLD AUTO: 342 K/UL (ref 135–450)
PMV BLD AUTO: 8 FL (ref 5–10.5)
POTASSIUM SERPL-SCNC: 4.4 MMOL/L (ref 3.5–5.1)
RBC # BLD AUTO: 4.34 M/UL (ref 4–5.2)
RPT COMMENT: NORMAL
SODIUM SERPL-SCNC: 140 MMOL/L (ref 136–145)
WBC # BLD AUTO: 16 K/UL (ref 4–11)

## 2023-03-14 PROCEDURE — 80048 BASIC METABOLIC PNL TOTAL CA: CPT

## 2023-03-14 PROCEDURE — 6370000000 HC RX 637 (ALT 250 FOR IP): Performed by: INTERNAL MEDICINE

## 2023-03-14 PROCEDURE — 99232 SBSQ HOSP IP/OBS MODERATE 35: CPT | Performed by: INTERNAL MEDICINE

## 2023-03-14 PROCEDURE — 36415 COLL VENOUS BLD VENIPUNCTURE: CPT

## 2023-03-14 PROCEDURE — 94640 AIRWAY INHALATION TREATMENT: CPT

## 2023-03-14 PROCEDURE — 2580000003 HC RX 258: Performed by: HOSPITALIST

## 2023-03-14 PROCEDURE — 2580000003 HC RX 258: Performed by: INTERNAL MEDICINE

## 2023-03-14 PROCEDURE — 6360000002 HC RX W HCPCS: Performed by: HOSPITALIST

## 2023-03-14 PROCEDURE — 85027 COMPLETE CBC AUTOMATED: CPT

## 2023-03-14 PROCEDURE — 6370000000 HC RX 637 (ALT 250 FOR IP): Performed by: HOSPITALIST

## 2023-03-14 PROCEDURE — 1200000000 HC SEMI PRIVATE

## 2023-03-14 PROCEDURE — 85520 HEPARIN ASSAY: CPT

## 2023-03-14 PROCEDURE — 6360000002 HC RX W HCPCS: Performed by: INTERNAL MEDICINE

## 2023-03-14 PROCEDURE — 83735 ASSAY OF MAGNESIUM: CPT

## 2023-03-14 RX ORDER — LACTOBACILLUS RHAMNOSUS GG 10B CELL
1 CAPSULE ORAL
Status: DISCONTINUED | OUTPATIENT
Start: 2023-03-14 | End: 2023-03-15 | Stop reason: HOSPADM

## 2023-03-14 RX ORDER — IPRATROPIUM BROMIDE AND ALBUTEROL SULFATE 2.5; .5 MG/3ML; MG/3ML
1 SOLUTION RESPIRATORY (INHALATION) EVERY 4 HOURS PRN
Status: DISCONTINUED | OUTPATIENT
Start: 2023-03-14 | End: 2023-03-15 | Stop reason: HOSPADM

## 2023-03-14 RX ADMIN — PIPERACILLIN AND TAZOBACTAM 3375 MG: 3; .375 INJECTION, POWDER, FOR SOLUTION INTRAVENOUS at 01:07

## 2023-03-14 RX ADMIN — Medication 2 PUFF: at 19:57

## 2023-03-14 RX ADMIN — Medication 1 CAPSULE: at 10:21

## 2023-03-14 RX ADMIN — SODIUM CHLORIDE, PRESERVATIVE FREE 10 ML: 5 INJECTION INTRAVENOUS at 08:02

## 2023-03-14 RX ADMIN — PANTOPRAZOLE SODIUM 40 MG: 40 TABLET, DELAYED RELEASE ORAL at 15:59

## 2023-03-14 RX ADMIN — SODIUM CHLORIDE: 9 INJECTION, SOLUTION INTRAVENOUS at 04:37

## 2023-03-14 RX ADMIN — PIPERACILLIN AND TAZOBACTAM 3375 MG: 3; .375 INJECTION, POWDER, FOR SOLUTION INTRAVENOUS at 08:03

## 2023-03-14 RX ADMIN — HEPARIN SODIUM 1070 UNITS/HR: 10000 INJECTION, SOLUTION INTRAVENOUS at 16:01

## 2023-03-14 RX ADMIN — PANTOPRAZOLE SODIUM 40 MG: 40 TABLET, DELAYED RELEASE ORAL at 04:50

## 2023-03-14 RX ADMIN — IPRATROPIUM BROMIDE AND ALBUTEROL SULFATE 1 AMPULE: 2.5; .5 SOLUTION RESPIRATORY (INHALATION) at 08:16

## 2023-03-14 RX ADMIN — Medication 2 PUFF: at 08:17

## 2023-03-14 RX ADMIN — PIPERACILLIN AND TAZOBACTAM 3375 MG: 3; .375 INJECTION, POWDER, FOR SOLUTION INTRAVENOUS at 16:00

## 2023-03-14 RX ADMIN — FUROSEMIDE 20 MG: 20 TABLET ORAL at 08:02

## 2023-03-14 RX ADMIN — LEVOTHYROXINE SODIUM 50 MCG: 50 TABLET ORAL at 04:50

## 2023-03-14 ASSESSMENT — PAIN SCALES - GENERAL: PAINLEVEL_OUTOF10: 0

## 2023-03-14 NOTE — CARE COORDINATION
Chart reviewed. Acute PE, PNA, lung fibrosis. Management per pulmonology, nephro and IM. BLE dopplers ordered. ECHO pending. Heparin gtt. IVF ABX. Pt from home with son. IPTA. PTOT rec home with assist PRN/supervision. Pt required 1 liter of oxygen during walk test today. Will watch O2 needs. CM will follow, should needs arise.  Evita Anderson RN

## 2023-03-14 NOTE — PLAN OF CARE
Oxygen documentation:     O2 saturation at REST on ROOM AIR = ____96__%     If saturation is 89% or above please proceed with steps 2 and 3.      O2 saturation with AMBULATION of __100___ feet on ROOM AIR = __87___%   O2 saturation with AMBULATION on __1_____ liter/min = ___94___%     DCP notified: ______

## 2023-03-14 NOTE — PLAN OF CARE
Spoke with Infection prevention, patient does not need isolation precaution as testing is to r/o NT per Dr. Nola Gentile note.

## 2023-03-14 NOTE — PROGRESS NOTES
Interval History and plan:      It appears per Dr Yash Ramírez notes seen by Dr Lane Granger, endocrinologist   But couldn't find the chart    She has significant hyperglycemia    Better than yesterday  In the past her PTH was very high, serum calcium Urine was low    PTH can be high in 10% of the [address]    PTH high    Plan:    She may benefit by doing isotopic scan  However will try to talk to daughter regarding possibility of her being seen by endocrinology in the past    At this point because of the cough and history of fluid overload needing Lasix. IV fluid                     Assessment :     Hypercalcemia  Calcium 12.1 at the time of consult  Albumin is on the high side at 4.9 on consult     Acidosis  20 CO2 on  consult  Anion gap 17  Lactic acidosis low at high at 3.5  Lactic acidosis likely due to hypoxia      Idiopathic lung fibrosis  Acute PE  Bilateral pneumonia      [facility] would like to thank Tobin Saul MD   for opportunity to serve this patient      Please call with questions at-   24 Hrs Answering service [phone] or  7 am- 5 pm via Perfect serve or cell phone  Kelby Valdes MD          CC/reason for consult :     Hypercalcemia     HPI :     Mohini Block is a 80 y.o. female presented to   the hospital on 3/12/2023 with known pulmonary fibrosis. Came to the hospital with increased shortness of breath and cough. She had a drop in oxygen saturation at home. Also complaining of swelling of the leg chronically. She came to the ED when she was found to have PE per CT scan. Also possible atypical bacterial infection. On presentation her lactate was 2.4  Calcium was 11.2. She is getting IV antibiotics.   Getting heparin drip    In the meantime we are consulted for Hypercalcemia and related issues  ROS:     Seen with- family    positives in bold   Constitutional:  fever, chills, weakness, weight change, fatigue  Skin:  rash, pruritus, hair loss, bruising, dry skin, petechiae  Head, Face, Neck   headaches, swelling,  cervical adenopathy  Respiratory: shortness of breath, cough, or wheezing  Cardiovascular: chest pain, palpitations, dizzy, edema  Gastrointestinal: nausea, vomiting, diarrhea, constipation,belly pain    Yellow skin, blood in stool  Musculoskeletal:  back pain, muscle weakness, gait problems,       joint pain or swelling. Genitourinary:  dysuria, poor urine flow, flank pain, blood in urine  Neurologic:  vertigo, TIA'S, syncope, seizures, focal weakness  Psychosocial:  insomnia, anxiety, or depression. Additional positive findings:                    All other remaining systems are negative or unable to obtain        PMH/PSH/SH/Family History:     Past Medical History:   Diagnosis Date    Allergic rhinitis, mild     Arthritis     Asthma     Bloating     Cancer (HCC)     skin    Swallowing difficulty     Thyroid disease        Past Surgical History:   Procedure Laterality Date    CHOLECYSTECTOMY      COLONOSCOPY  7/24/14    colon Bx,s/ esoph Bx    HYSTERECTOMY (CERVIX STATUS UNKNOWN)      TUBAL LIGATION      UPPER GASTROINTESTINAL ENDOSCOPY  5/22/12    bx    WISDOM TOOTH EXTRACTION          reports that she quit smoking about 54 years ago. She has never used smokeless tobacco. She reports current alcohol use. She reports that she does not use drugs. family history includes Heart Disease in her father and mother.          Medication:     Current Facility-Administered Medications: lactobacillus (CULTURELLE) capsule 1 capsule, 1 capsule, Oral, Daily with breakfast  benzocaine-menthol (CEPACOL SORE THROAT) lozenge 1 lozenge, 1 lozenge, Oral, Q2H PRN  glucose chewable tablet 16 g, 4 tablet, Oral, PRN  dextrose bolus 10% 125 mL, 125 mL, IntraVENous, PRN **OR** dextrose bolus 10% 250 mL, 250 mL, IntraVENous, PRN  glucagon (rDNA) injection 1 mg, 1 mg, SubCUTAneous, PRN  dextrose 10 % infusion, , IntraVENous, Continuous PRN  heparin 25,000 units in dextrose 5% 250 mL (premix) infusion, 1,070 Units/hr, IntraVENous, Continuous  carboxymethylcellulose PF (THERATEARS) 1 % ophthalmic gel 2 drop, 2 drop, Both Eyes, PRN  pantoprazole (PROTONIX) tablet 40 mg, 40 mg, Oral, BID AC  levothyroxine (SYNTHROID) tablet 50 mcg, 50 mcg, Oral, Daily  piperacillin-tazobactam (ZOSYN) 3,375 mg in sodium chloride 0.9 % 50 mL IVPB (mini-bag), 3,375 mg, IntraVENous, Q8H  sodium chloride flush 0.9 % injection 5-40 mL, 5-40 mL, IntraVENous, 2 times per day  sodium chloride flush 0.9 % injection 5-40 mL, 5-40 mL, IntraVENous, PRN  0.9 % sodium chloride infusion, , IntraVENous, PRN  ondansetron (ZOFRAN-ODT) disintegrating tablet 4 mg, 4 mg, Oral, Q8H PRN **OR** ondansetron (ZOFRAN) injection 4 mg, 4 mg, IntraVENous, Q6H PRN  polyethylene glycol (GLYCOLAX) packet 17 g, 17 g, Oral, Daily PRN  acetaminophen (TYLENOL) tablet 650 mg, 650 mg, Oral, Q6H PRN **OR** acetaminophen (TYLENOL) suppository 650 mg, 650 mg, Rectal, Q6H PRN  potassium chloride (KLOR-CON M) extended release tablet 40 mEq, 40 mEq, Oral, PRN **OR** potassium bicarb-citric acid (EFFER-K) effervescent tablet 40 mEq, 40 mEq, Oral, PRN **OR** potassium chloride 10 mEq/100 mL IVPB (Peripheral Line), 10 mEq, IntraVENous, PRN  magnesium sulfate 2000 mg in 50 mL IVPB premix, 2,000 mg, IntraVENous, PRN  heparin (porcine) injection 5,000 Units, 5,000 Units, IntraVENous, PRN  heparin (porcine) injection 2,500 Units, 2,500 Units, IntraVENous, PRN  ipratropium-albuterol (DUONEB) nebulizer solution 1 ampule, 1 ampule, Inhalation, Q4H WA  perflutren lipid microspheres (DEFINITY) injection 1.5 mL, 1.5 mL, IntraVENous, ONCE PRN  mometasone-formoterol (DULERA) 200-5 MCG/ACT inhaler 2 puff, 2 puff, Inhalation, BID       Vitals :     Vitals:    03/14/23 0818   BP:    Pulse:    Resp:    Temp:    SpO2: 100%       I & O :       Intake/Output Summary (Last 24 hours) at 3/14/2023 1134  Last data filed at 3/14/2023 0107  Gross per 24 hour   Intake 1252.42 ml   Output 2100 ml   Net -847.58 ml          Physical Examination :     General appearance: Alert oriented very pleasant   Respiratory: Comfortable on oxygen  Cardiovascular: NAD, Edema-  Abdomen: -Soft nontender no distention  Other relevant findings:-       LABS:     Recent Labs     03/12/23  1919 03/13/23  0632 03/14/23  0632   WBC 10.7 17.0* 16.0*   HGB 15.1 14.7 13.4   HCT 46.5 45.4 40.8    379 342       Recent Labs     03/12/23  1309 03/13/23  0632 03/14/23  0632   * 138 140   K 4.3 4.6 4.4   CL 98* 101 103   CO2 25 20* 26   BUN 12 13 20   CREATININE 0.8 0.9 1.1   GLUCOSE 93 113* 85          Thanks,   [facility]  [address], [address]  Office: [phone]  Fax: [phone]

## 2023-03-14 NOTE — PLAN OF CARE
Problem: Discharge Planning  Goal: Discharge to home or other facility with appropriate resources  Outcome: Progressing     Problem: Pain  Goal: Verbalizes/displays adequate comfort level or baseline comfort level  Outcome: Progressing     Problem: ABCDS Injury Assessment  Goal: Absence of physical injury  Outcome: Progressing     Problem: Respiratory - Adult  Goal: Achieves optimal ventilation and oxygenation  Outcome: Progressing  Flowsheets (Taken 3/13/2023 2001 by Anabel Levine RN)  Achieves optimal ventilation and oxygenation: Assess for changes in respiratory status     Problem: Cardiovascular - Adult  Goal: Maintains optimal cardiac output and hemodynamic stability  Outcome: Progressing  Goal: Absence of cardiac dysrhythmias or at baseline  Outcome: Progressing     Problem: Infection - Adult  Goal: Absence of infection at discharge  Outcome: Progressing     Problem: Safety - Adult  Goal: Free from fall injury  Outcome: Progressing

## 2023-03-14 NOTE — PROGRESS NOTES
Hospitalist Progress Note      PCP: Jacey Joseph MD    Date of Admission: 3/12/2023    Chief Complaint: cough    Hospital Course: Joyce Anton is a 80 y.o. female who presented to the ED to be evaluated for increased dyspnea and cough in the setting of known idiopathic pulmonary fibrosis (IPF). She reports that more recently she has noted her home pulse oximetry reading as low as 90%, which is atypical for her because she is not oxygen dependent at baseline. Upon further questioning patient does endorse intermittent chest pain, pleuritic in nature. She denies unilateral leg edema or pain, but does note BLE swelling chronically. Patient has no previous history of blood clots, no known hypercoagulability, and is not on chronic anticoagulation. Upon her to the ED EKG was obtained revealing NSR with PVCs and low voltage QRS. Chest CT with PE protocol demonstrates acute PE in anterior RUL, as well as age-indeterminate emboli in RLL. Patient also with scattered peripheral groundglass infiltrates bilaterally suggestive of infection with atypical bacteria. Notable labs include: Lactate 2.4 and hypercalcemia 11.2. Following collection of blood cultures patient received a dosage of IV Rocephin and Zithromax. She also was treated with IV Solu-Medrol and DuoNebs due to bronchospasm upon arrival.  Finally, she was placed on high-dose weight-based heparin drip to treat acute PE, prior to rest for admission. Subjective: SOB, cough unchanged. Denies chest pains.        Medications:  Reviewed    Infusion Medications    dextrose      sodium chloride 100 mL/hr at 03/14/23 0437    heparin (PORCINE) Infusion 1,070 Units/hr (03/13/23 1620)    sodium chloride       Scheduled Medications    lactobacillus  1 capsule Oral Daily with breakfast    furosemide  20 mg Oral Daily    pantoprazole  40 mg Oral BID AC    levothyroxine  50 mcg Oral Daily    piperacillin-tazobactam  3,375 mg IntraVENous Q8H    sodium chloride flush  5-40 mL IntraVENous 2 times per day    ipratropium-albuterol  1 ampule Inhalation Q4H WA    mometasone-formoterol  2 puff Inhalation BID     PRN Meds: benzocaine-menthol, glucose, dextrose bolus **OR** dextrose bolus, glucagon (rDNA), dextrose, carboxymethylcellulose PF, sodium chloride flush, sodium chloride, ondansetron **OR** ondansetron, polyethylene glycol, acetaminophen **OR** acetaminophen, potassium chloride **OR** potassium alternative oral replacement **OR** potassium chloride, magnesium sulfate, heparin (porcine), heparin (porcine), perflutren lipid microspheres      Intake/Output Summary (Last 24 hours) at 3/14/2023 0922  Last data filed at 3/14/2023 0107  Gross per 24 hour   Intake 1492.42 ml   Output 2650 ml   Net -1157.58 ml       Physical Exam Performed:    BP (!) 160/101   Pulse 75   Temp 97.7 °F (36.5 °C) (Oral)   Resp 17   Ht 5' 1\" (1.549 m)   Wt 189 lb 4.8 oz (85.9 kg)   SpO2 100%   BMI 35.77 kg/m²     General appearance: No apparent distress, appears stated age and cooperative. HEENT: Pupils equal, round, and reactive to light. Conjunctivae/corneas clear. Neck: Supple, with full range of motion. No jugular venous distention. Trachea midline. Respiratory:  Normal respiratory effort. Clear to auscultation, bilaterally without Rales/Wheezes/Rhonchi. Cardiovascular: Regular rate and rhythm with normal S1/S2 without murmurs, rubs or gallops. Abdomen: Soft, non-tender, non-distended with normal bowel sounds. Musculoskeletal: No clubbing, cyanosis or edema bilaterally. Full range of motion without deformity. Skin: Skin color, texture, turgor normal.  No rashes or lesions. Neurologic:  Neurovascularly intact without any focal sensory/motor deficits.  Cranial nerves: II-XII intact, grossly non-focal.  Psychiatric: Alert and oriented, thought content appropriate, normal insight  Capillary Refill: Brisk, 3 seconds, normal   Peripheral Pulses: +2 palpable, equal bilaterally Labs:   Recent Labs     03/12/23 1919 03/13/23  0632 03/14/23  0632   WBC 10.7 17.0* 16.0*   HGB 15.1 14.7 13.4   HCT 46.5 45.4 40.8    379 342     Recent Labs     03/12/23  1309 03/13/23  0632 03/14/23  0632   * 138 140   K 4.3 4.6 4.4   CL 98* 101 103   CO2 25 20* 26   BUN 12 13 20   CREATININE 0.8 0.9 1.1   CALCIUM 11.2* 12.1* 11.4*     Recent Labs     03/12/23  1309 03/13/23  0632   AST 25 23   ALT 20 21   BILITOT 0.4 0.4   ALKPHOS 92 96     Recent Labs     03/12/23 1919   INR 1.13     Recent Labs     03/12/23  1309 03/12/23 1919 03/12/23  2237   TROPONINI <0.01 <0.01 <0.01       Urinalysis:      Lab Results   Component Value Date/Time    NITRU Negative 03/12/2023 02:01 PM    WBCUA 21-50 03/12/2023 02:01 PM    BACTERIA Rare 03/12/2023 02:01 PM    RBCUA 3-4 03/12/2023 02:01 PM    BLOODU TRACE-INTACT 03/12/2023 02:01 PM    SPECGRAV 1.010 03/12/2023 02:01 PM    GLUCOSEU Negative 03/12/2023 02:01 PM       Radiology:  VL Extremity Venous Bilateral   Final Result      CT CHEST PULMONARY EMBOLISM W CONTRAST   Final Result   Acute appeared pulmonary embolism in the anterior right upper lobe. Age-indeterminate thread-like pulmonary emboli in the right lower lobe. Although acute PE is not excluded, these may be subacute to chronic. Scattered peripheral ground-glass infiltrates within both lungs, greatest in   the upper lungs. These may relate to active interstitial fibrosis. However,   infectious etiologies should be considered as well, especially atypical   etiologies. Findings were discussed with Charli Al at 3:50 pm on 3/12/2023. XR CHEST PORTABLE   Final Result   1. No acute radiographic finding to account for patient's shortness of breath. 2. Questionable nodular density in the right mid lung. Consider CT chest for   further evaluation.              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 Continue zosyn for now    UTI  - urine culture positive for enterococcus  - continue zosyn    Sinus tachycardia  - likely related to acute PE  - continue to monitor    Hypercalcemia  - reportedly with familial hypocalciuric hypercalcemia  - nephrology consulted  - PTH mildly elevated  - continue IVF  - Ca improving. Continue to monitor    Hypothyroidism  - continue synthroid    Obesity  With Body mass index is 35.77 kg/m². Complicating assessment and treatment. Placing patient at risk for multiple co-morbidities as well as early death and contributing to the patient's presentation. Counseled on weight loss. DVT Prophylaxis: heparin gtt  Diet: ADULT DIET;  Regular; Low Fat/Low Chol/High Fiber/TIM; No Concentrated sweets  Code Status: Full Code  PT/OT Eval Status: ordered    Dispo - home in 1-2 days    Appropriate for A1 Discharge Unit: Melba Cain MD

## 2023-03-14 NOTE — PROGRESS NOTES
INPATIENT PULMONARY CRITICAL CARE PROGRESS NOTE      Reason for visit: Acute pulmonary embolism, UIP/IPF with likely progression. Possible pneumonia. Has had a history of hypercalcemia    SUBJECTIVE: The patient remains afebrile and hemodynamically stable, adequate saturation on room air. She says she coughs a lot, but is unable to bring up sputum. She did have a diarrheal bowel movement this morning, denies abdominal pain. She has no urinary complaints. She remembers the beginning of the pulmonary fibrosis episode. She was seeing an allergist for asthma, CT chest showed abnormality and therefore she was seen by pulmonology. Physical Exam:  Blood pressure (!) 160/101, pulse 75, temperature 97.7 °F (36.5 °C), temperature source Oral, resp. rate 17, height 5' 1\" (1.549 m), weight 189 lb 4.8 oz (85.9 kg), SpO2 100 %.'   Constitutional: Very pleasant, looks good for her stated age. In no acute distress. Overweight  HENT:  Oropharynx is clear and moist.  Class III airway. Hard of hearing, hearing aids  Eyes:  Conjunctivae are normal.  No scleral icterus. Neck: Relatively short and large neck, no JVD  Cardiovascular: Normal rate, regular rhythm, normal heart sounds. No lower extremity edema. Pulmonary/Chest: No wheezes. No rales. No accessory muscle usage or stridor. Abdominal: Deferred. Musculoskeletal: No cyanosis. No clubbing. No obvious joint deformity. Lymphadenopathy: Deferred. Skin: Skin is warm and dry. No rash or nodules on the exposed extremities. Psychiatric: Normal mood and affect. Behavior is normal.  No anxiety. Neurologic: Alert, awake and oriented. PERRL. Speech fluent.   No obvious neurologic deficit, detailed exam not performed      Results:  CBC:   Recent Labs     03/12/23  1919 03/13/23  0632 03/14/23  0632   WBC 10.7 17.0* 16.0*   HGB 15.1 14.7 13.4   HCT 46.5 45.4 40.8   MCV 95.6 96.9 93.9    379 342     BMP:   Recent Labs     03/12/23  1309 03/13/23  3109 03/14/23  0632   * 138 140   K 4.3 4.6 4.4   CL 98* 101 103   CO2 25 20* 26   BUN 12 13 20   CREATININE 0.8 0.9 1.1     LIVER PROFILE:   Recent Labs     03/12/23  1309 03/13/23  0632   AST 25 23   ALT 20 21   BILITOT 0.4 0.4   ALKPHOS 92 96     PT/INR:   Recent Labs     03/12/23  1919   PROTIME 14.4   INR 1.13     APTT:   Recent Labs     03/12/23  1307 03/12/23  2237   APTT 30.0 118.3*     UA:  Recent Labs     03/12/23  1401   COLORU Yellow   PHUR 6.5   WBCUA 21-50*   RBCUA 3-4   BACTERIA Rare*   CLARITYU Clear   SPECGRAV 1.010   LEUKOCYTESUR MODERATE*   UROBILINOGEN 0.2   BILIRUBINUR Negative   BLOODU TRACE-INTACT*   GLUCOSEU Negative       Imaging:  I have reviewed radiology images personally. VL Extremity Venous Bilateral   Final Result      CT CHEST PULMONARY EMBOLISM W CONTRAST   Final Result   Acute appeared pulmonary embolism in the anterior right upper lobe. Age-indeterminate thread-like pulmonary emboli in the right lower lobe. Although acute PE is not excluded, these may be subacute to chronic. Scattered peripheral ground-glass infiltrates within both lungs, greatest in   the upper lungs. These may relate to active interstitial fibrosis. However,   infectious etiologies should be considered as well, especially atypical   etiologies. Findings were discussed with Valentino Mustard at 3:50 pm on 3/12/2023. XR CHEST PORTABLE   Final Result   1. No acute radiographic finding to account for patient's shortness of breath. 2. Questionable nodular density in the right mid lung. Consider CT chest for   further evaluation. Assessment and plan:  Acute pulmonary embolism without acute cor pulmonale, unspecified pulmonary embolism type ([address] CT images reviewed, the patient does have a small clot in the right lower lobe vessel, possible right upper lobe anterior clot. Agree with anticoagulation, can change to oral therapy with NOAC.   Small clot burden unlikely to cause pulmonary hypertension, echocardiogram did not show pulm hypertension. IPF (idiopathic pulmonary fibrosis) ([address] UIP confirmed by open lung biopsy in December 2019. Differential HP with fibrosis was raised on biopsy due to upper lobe involvement and minimal honeycombing. On OFEV as outpatient, dose increased recently. Would consider not prescribing  nitrofurantoin, started possibly in 7/24/2014. Acute, subacute and chronic lung injury can occur with it. Pharmacy noted-the patient has been on it since March 2020, having received a 7-day course in March 2020, multiple 1 week courses in May, June 2022 for 1 week each, a 10-day course in August 2022 and a 90-day supply on September 13, 2022. The patient's son notes that her medication closet, Macrodantin was not there. ? Hypoxia. Walking oximetry. Exercise desaturation common in ILD. Bronchiectasis with acute lower respiratory infection ([address] Mild bronchiectasis. Likely related to pulmonary fibrosis. Bilateral pneumonia. CT likely suggests part of the ILD picture rather than pneumonia. Mild lactic acidosis, mildly elevated anion gap. Blood cultures pending. No elevation in white count or procalcitonin. Unable to cough up sputum. Could consider discharge on empiric oral Levaquin 500 mg daily for 7 to 10 days. UTI. Low colony count with Enterococcus faecalis. She has a history of recurrent UTI. Hypercalcemia. Nephrology following  Leukocytosis. Probably due to steroids. TIEN. Not compliant with CPAP  DJD, hypothyroidism, spinal stenosis. Per IM  Obesity. Caloric restriction and increased activity  DVT prophylaxis. Currently on heparin infusion    Discussed with patient, nursing.       Electronically signed by:  Arely Colby MD    3/14/2023    11:45 AM.

## 2023-03-15 VITALS
WEIGHT: 189.3 LBS | HEART RATE: 87 BPM | DIASTOLIC BLOOD PRESSURE: 78 MMHG | HEIGHT: 61 IN | OXYGEN SATURATION: 93 % | BODY MASS INDEX: 35.74 KG/M2 | TEMPERATURE: 98 F | SYSTOLIC BLOOD PRESSURE: 134 MMHG | RESPIRATION RATE: 17 BRPM

## 2023-03-15 LAB
ALBUMIN SERPL ELPH-MCNC: 3 G/DL (ref 3.1–4.9)
ALPHA1 GLOB SERPL ELPH-MCNC: 0.2 G/DL (ref 0.2–0.4)
ALPHA2 GLOB SERPL ELPH-MCNC: 1.5 G/DL (ref 0.4–1.1)
ANION GAP SERPL CALCULATED.3IONS-SCNC: 11 MMOL/L (ref 3–16)
ANTI-XA UNFRAC HEPARIN: 0.49 IU/ML (ref 0.3–0.7)
ANTI-XA UNFRAC HEPARIN: <0.1 IU/ML (ref 0.3–0.7)
B-GLOBULIN SERPL ELPH-MCNC: 1.6 G/DL (ref 0.9–1.6)
BUN SERPL-MCNC: 21 MG/DL (ref 7–20)
CALCIUM SERPL-MCNC: 11.1 MG/DL (ref 8.3–10.6)
CHLORIDE SERPL-SCNC: 105 MMOL/L (ref 99–110)
CO2 SERPL-SCNC: 24 MMOL/L (ref 21–32)
CREAT SERPL-MCNC: 1 MG/DL (ref 0.6–1.2)
DEPRECATED RDW RBC AUTO: 14.5 % (ref 12.4–15.4)
GAMMA GLOB SERPL ELPH-MCNC: 1.1 G/DL (ref 0.6–1.8)
GFR SERPLBLD CREATININE-BSD FMLA CKD-EPI: 56 ML/MIN/{1.73_M2}
GLUCOSE SERPL-MCNC: 98 MG/DL (ref 70–99)
HCT VFR BLD AUTO: 42.3 % (ref 36–48)
HGB BLD-MCNC: 14 G/DL (ref 12–16)
MCH RBC QN AUTO: 31.1 PG (ref 26–34)
MCHC RBC AUTO-ENTMCNC: 33.1 G/DL (ref 31–36)
MCV RBC AUTO: 94.1 FL (ref 80–100)
PLATELET # BLD AUTO: 334 K/UL (ref 135–450)
PMV BLD AUTO: 7.6 FL (ref 5–10.5)
POTASSIUM SERPL-SCNC: 4 MMOL/L (ref 3.5–5.1)
PROT SERPL-MCNC: 7.4 G/DL (ref 6.4–8.2)
RBC # BLD AUTO: 4.5 M/UL (ref 4–5.2)
SODIUM SERPL-SCNC: 140 MMOL/L (ref 136–145)
SPE/IFE INTERPRETATION: NORMAL
WBC # BLD AUTO: 9.4 K/UL (ref 4–11)

## 2023-03-15 PROCEDURE — 99232 SBSQ HOSP IP/OBS MODERATE 35: CPT | Performed by: INTERNAL MEDICINE

## 2023-03-15 PROCEDURE — 94640 AIRWAY INHALATION TREATMENT: CPT

## 2023-03-15 PROCEDURE — 6370000000 HC RX 637 (ALT 250 FOR IP): Performed by: INTERNAL MEDICINE

## 2023-03-15 PROCEDURE — 6370000000 HC RX 637 (ALT 250 FOR IP): Performed by: HOSPITALIST

## 2023-03-15 PROCEDURE — 36415 COLL VENOUS BLD VENIPUNCTURE: CPT

## 2023-03-15 PROCEDURE — 85027 COMPLETE CBC AUTOMATED: CPT

## 2023-03-15 PROCEDURE — 85520 HEPARIN ASSAY: CPT

## 2023-03-15 PROCEDURE — 2580000003 HC RX 258: Performed by: INTERNAL MEDICINE

## 2023-03-15 PROCEDURE — 80048 BASIC METABOLIC PNL TOTAL CA: CPT

## 2023-03-15 PROCEDURE — 6360000002 HC RX W HCPCS: Performed by: INTERNAL MEDICINE

## 2023-03-15 PROCEDURE — 2580000003 HC RX 258: Performed by: HOSPITALIST

## 2023-03-15 RX ORDER — LEVOFLOXACIN 250 MG/1
250 TABLET ORAL DAILY
Qty: 6 TABLET | Refills: 0 | Status: SHIPPED | OUTPATIENT
Start: 2023-03-16 | End: 2023-03-22

## 2023-03-15 RX ORDER — LEVOFLOXACIN 500 MG/1
250 TABLET, FILM COATED ORAL DAILY
Status: DISCONTINUED | OUTPATIENT
Start: 2023-03-15 | End: 2023-03-15 | Stop reason: HOSPADM

## 2023-03-15 RX ADMIN — PANTOPRAZOLE SODIUM 40 MG: 40 TABLET, DELAYED RELEASE ORAL at 05:53

## 2023-03-15 RX ADMIN — LEVOFLOXACIN 250 MG: 500 TABLET, FILM COATED ORAL at 08:46

## 2023-03-15 RX ADMIN — APIXABAN 10 MG: 5 TABLET, FILM COATED ORAL at 08:47

## 2023-03-15 RX ADMIN — LEVOTHYROXINE SODIUM 50 MCG: 50 TABLET ORAL at 05:53

## 2023-03-15 RX ADMIN — Medication 1 CAPSULE: at 08:46

## 2023-03-15 RX ADMIN — PIPERACILLIN AND TAZOBACTAM 3375 MG: 3; .375 INJECTION, POWDER, FOR SOLUTION INTRAVENOUS at 00:25

## 2023-03-15 RX ADMIN — Medication 2 PUFF: at 08:12

## 2023-03-15 RX ADMIN — SODIUM CHLORIDE, PRESERVATIVE FREE 10 ML: 5 INJECTION INTRAVENOUS at 08:47

## 2023-03-15 NOTE — PROGRESS NOTES
INPATIENT PULMONARY CRITICAL CARE PROGRESS NOTE      Reason for visit: Acute pulmonary embolism, UIP/IPF with likely progression. Possible pneumonia. Has had a history of hypercalcemia    SUBJECTIVE: The patient remains afebrile and hemodynamically stable, adequate saturation on room air. Did not desaturate with walking, needed supplemental oxygen to keep her SaO2 >90%. She says she is still coughing, but is unable to bring up sputum. She denies GI or  complaints. She remembers the beginning of the pulmonary fibrosis episode. She was seeing an allergist for asthma, CT chest showed abnormality and therefore she was seen by pulmonology. Physical Exam:  Blood pressure 128/77, pulse 98, temperature 97.6 °F (36.4 °C), temperature source Oral, resp. rate 17, height 5' 1\" (1.549 m), weight 189 lb 4.8 oz (85.9 kg), SpO2 93 %.'   Constitutional: Very pleasant, looks good for her stated age. In no acute distress. Overweight. Coughing intermittently  HENT:  Oropharynx is clear and moist.  Class III airway. Hard of hearing, hearing aids  Eyes:  Conjunctivae are normal.  No scleral icterus. Neck: Relatively short and large neck, no JVD  Cardiovascular: Normal rate, regular rhythm, normal heart sounds. No lower extremity edema. Pulmonary/Chest: Few scattered wheezes. No rales. No accessory muscle usage or stridor. Abdominal: Deferred. Musculoskeletal: No cyanosis. No clubbing. No obvious joint deformity. Lymphadenopathy: Deferred. Skin: Skin is warm and dry. No rash or nodules on the exposed extremities. Psychiatric: Normal mood and affect. Behavior is normal.  No anxiety. Neurologic: Alert, awake and oriented. PERRL. Speech fluent.   No obvious neurologic deficit, detailed exam not performed      Results:  CBC:   Recent Labs     03/13/23  0632 03/14/23  0632 03/15/23  0602   WBC 17.0* 16.0* 9.4   HGB 14.7 13.4 14.0   HCT 45.4 40.8 42.3   MCV 96.9 93.9 94.1    342 334       BMP:   Recent Labs     03/13/23  0632 03/14/23  0632 03/15/23  0602    140 140   K 4.6 4.4 4.0    103 105   CO2 20* 26 24   BUN 13 20 21*   CREATININE 0.9 1.1 1.0       LIVER PROFILE:   Recent Labs     03/12/23  1309 03/13/23  0632   AST 25 23   ALT 20 21   BILITOT 0.4 0.4   ALKPHOS 92 96       PT/INR:   Recent Labs     03/12/23  1919   PROTIME 14.4   INR 1.13       APTT:   Recent Labs     03/12/23  1307 03/12/23  2237   APTT 30.0 118.3*       UA:  Recent Labs     03/12/23  1401   COLORU Yellow   PHUR 6.5   WBCUA 21-50*   RBCUA 3-4   BACTERIA Rare*   CLARITYU Clear   SPECGRAV 1.010   LEUKOCYTESUR MODERATE*   UROBILINOGEN 0.2   BILIRUBINUR Negative   BLOODU TRACE-INTACT*   GLUCOSEU Negative         Imaging:  I have reviewed radiology images personally. VL Extremity Venous Bilateral   Final Result      CT CHEST PULMONARY EMBOLISM W CONTRAST   Final Result   Acute appeared pulmonary embolism in the anterior right upper lobe. Age-indeterminate thread-like pulmonary emboli in the right lower lobe. Although acute PE is not excluded, these may be subacute to chronic. Scattered peripheral ground-glass infiltrates within both lungs, greatest in   the upper lungs. These may relate to active interstitial fibrosis. However,   infectious etiologies should be considered as well, especially atypical   etiologies. Findings were discussed with Amrita Ramsey at 3:50 pm on 3/12/2023. XR CHEST PORTABLE   Final Result   1. No acute radiographic finding to account for patient's shortness of breath. 2. Questionable nodular density in the right mid lung. Consider CT chest for   further evaluation. Assessment and plan:  Acute pulmonary embolism without acute cor pulmonale, unspecified pulmonary embolism type ([address] CT images reviewed, the patient does have a small clot in the right lower lobe vessel, possible right upper lobe anterior clot. On Eliquis.   Small clot burden unlikely to cause pulmonary hypertension, echocardiogram did not show pulm hypertension. IPF (idiopathic pulmonary fibrosis) ([address] UIP confirmed by open lung biopsy in December 2019. Differential HP with fibrosis was raised on biopsy due to upper lobe involvement and minimal honeycombing. On OFEV as outpatient, dose increased recently. Would consider not prescribing  nitrofurantoin, started possibly in 7/24/2014. Acute, subacute and chronic lung injury can occur with it. Pharmacy noted-the patient has been on it since March 2020, having received a 7-day course in March 2020, multiple 1 week courses in May, June 2022 for 1 week each, a 10-day course in August 2022 and a 90-day supply on September 13, 2022. The patient's son notes that her medication closet, Macrodantin was not there. ? Hypoxia. Walking oximetry. Exercise desaturation common in ILD. Bronchiectasis with acute lower respiratory infection ([address] Mild bronchiectasis. Likely related to pulmonary fibrosis. Continue Levaquin for about 7 days. Bilateral pneumonia. CT likely suggests part of the ILD picture rather than pneumonia. Mild lactic acidosis, mildly elevated anion gap. Blood cultures pending. No elevation in white count or procalcitonin. Unable to cough up sputum. Could consider discharge on empiric oral Levaquin 500 mg daily for 7 to 10 days. Whether she had pneumonia or not will be dependent on findings of repeat CT chest as an outpatient. Patient informed about this. UTI. Low colony count with Enterococcus faecalis. She has a history of recurrent UTI. Hypercalcemia. Nephrology following  Leukocytosis. Probably due to steroids. TIEN. Not compliant with CPAP  DJD, hypothyroidism, spinal stenosis. Per IM  Obesity. Caloric restriction and increased activity  DVT prophylaxis. Currently on heparin infusion    Discussed with patient, Dr. Rodriguez Early. Patient can be discharged from the pulmonary standpoint with follow-up with Dr. Maulik Weller.   We will sign off, thank you for the consultation.       Electronically signed by:  Obdulia Joe MD    3/15/2023    8:53 AM.

## 2023-03-15 NOTE — CARE COORDINATION
Chart reviewed. Acute PE, PNA, lung fibrosis. Management per pulmonology, nephro and IM. BLE dopplers negative for DVT. ECHO completed. On PO anticoagulation and PO ABX. Pt from home with son. IPTA. PTOT rec home with assist PRN/supervision. Pt required 1 liter of oxygen during walk test 3/14. Will watch O2 needs. CM will follow, should needs arise.  Evita Martinez RN

## 2023-03-15 NOTE — PLAN OF CARE
Problem: Discharge Planning  Goal: Discharge to home or other facility with appropriate resources  Outcome: Progressing     Problem: Pain  Goal: Verbalizes/displays adequate comfort level or baseline comfort level  Outcome: Progressing     Problem: ABCDS Injury Assessment  Goal: Absence of physical injury  Outcome: Progressing     Problem: Respiratory - Adult  Goal: Achieves optimal ventilation and oxygenation  Outcome: Progressing     Problem: Cardiovascular - Adult  Goal: Maintains optimal cardiac output and hemodynamic stability  Outcome: Progressing  Goal: Absence of cardiac dysrhythmias or at baseline  Outcome: Progressing     Problem: Infection - Adult  Goal: Absence of infection at discharge  Outcome: Progressing     Problem: Safety - Adult  Goal: Free from fall injury  Outcome: Progressing

## 2023-03-15 NOTE — DISCHARGE SUMMARY
Hospital Medicine Discharge Summary    Patient ID: Amol Shepard      Patient's PCP: Danita Cline MD    Admit Date: 3/12/2023     Discharge Date:   03/15/23    Admitting Provider: Edd Simon MD     Discharge Provider: Lindy Morales MD     Discharge Diagnoses: Active Hospital Problems    Diagnosis     TIEN (obstructive sleep apnea) [G47.33]      Priority: Medium    Leukocytosis [D72.829]      Priority: Medium    Obesity (BMI 35.0-39.9 without comorbidity) [E66.9]      Priority: Medium    Acute pulmonary embolism (HCC) [I26.99]      Priority: Medium    Bilateral pneumonia [J18.9]      Priority: Medium    UIP (usual interstitial pneumonitis) (HCC) [J84.112]      Priority: Medium    Bronchiectasis without complication ([address] [V04.3]      Priority: Medium    Acute pulmonary embolism without acute cor pulmonale, unspecified pulmonary embolism type ([address] [I26.99]      Priority: Medium       The patient was seen and examined on day of discharge and this discharge summary is in conjunction with any daily progress note from day of discharge. Hospital Course: Amol Shepard is a 80 y.o. female who presented to the ED to be evaluated for increased dyspnea and cough in the setting of known idiopathic pulmonary fibrosis (IPF). She reports that more recently she has noted her home pulse oximetry reading as low as 90%, which is atypical for her because she is not oxygen dependent at baseline. Upon further questioning patient does endorse intermittent chest pain, pleuritic in nature. She denies unilateral leg edema or pain, but does note BLE swelling chronically. Patient has no previous history of blood clots, no known hypercoagulability, and is not on chronic anticoagulation. Upon her to the ED EKG was obtained revealing NSR with PVCs and low voltage QRS. Chest CT with PE protocol demonstrates acute PE in anterior RUL, as well as age-indeterminate emboli in RLL.   Patient also with scattered peripheral groundglass infiltrates bilaterally suggestive of infection with atypical bacteria. Notable labs include: Lactate 2.4 and hypercalcemia 11.2. Following collection of blood cultures patient received a dosage of IV Rocephin and Zithromax. She also was treated with IV Solu-Medrol and DuoNebs due to bronchospasm upon arrival.  Finally, she was placed on high-dose weight-based heparin drip to treat acute PE, prior to rest for admission. Acute PE  - heparin gtt changed to eliquis     Pulmonary fibrosis  - pulmonology consulted  - completed steroids  - continue inhalers  - low concern for pneumonia though difficult to fully rule out. 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 May actually have primary hyperparathyroidism     Hypothyroidism  - continue synthroid     Obesity  With Body mass index is 35.77 kg/m². Complicating assessment and treatment. Placing patient at risk for multiple co-morbidities as well as early death and contributing to the patient's presentation. Counseled on weight loss. Physical Exam Performed:     /67   Pulse 90   Temp 97.7 °F (36.5 °C) (Oral)   Resp 18   Ht 5' 1\" (1.549 m)   Wt 189 lb 4.8 oz (85.9 kg)   SpO2 90%   BMI 35.77 kg/m²       General appearance: No apparent distress, appears stated age and cooperative. HEENT: Pupils equal, round, and reactive to light. Conjunctivae/corneas clear. Neck: Supple, with full range of motion. No jugular venous distention. Trachea midline. Respiratory:  Normal respiratory effort. Clear to auscultation, bilaterally without Rales/Wheezes/Rhonchi.   Cardiovascular: Regular rate and rhythm with normal S1/S2 without murmurs, rubs or gallops. Abdomen: Soft, non-tender, non-distended with normal bowel sounds. Musculoskeletal: No clubbing, cyanosis or edema bilaterally. Full range of motion without deformity. Skin: Skin color, texture, turgor normal.  No rashes or lesions. Neurologic:  Neurovascularly intact without any focal sensory/motor deficits. Cranial nerves: II-XII intact, grossly non-focal.  Psychiatric: Alert and oriented, thought content appropriate, normal insight  Capillary Refill: Brisk, 3 seconds, normal   Peripheral Pulses: +2 palpable, equal bilaterally       Labs: For convenience and continuity at follow-up the following most recent labs are provided:      CBC:    Lab Results   Component Value Date/Time    WBC 9.4 03/15/2023 06:02 AM    HGB 14.0 03/15/2023 06:02 AM    HCT 42.3 03/15/2023 06:02 AM     03/15/2023 06:02 AM       Renal:    Lab Results   Component Value Date/Time     03/15/2023 06:02 AM    K 4.0 03/15/2023 06:02 AM     03/15/2023 06:02 AM    CO2 24 03/15/2023 06:02 AM    BUN 21 03/15/2023 06:02 AM    CREATININE 1.0 03/15/2023 06:02 AM    CALCIUM 11.1 03/15/2023 06:02 AM         Significant Diagnostic Studies    Radiology:   VL Extremity Venous Bilateral   Final Result      CT CHEST PULMONARY EMBOLISM W CONTRAST   Final Result   Acute appeared pulmonary embolism in the anterior right upper lobe. Age-indeterminate thread-like pulmonary emboli in the right lower lobe. Although acute PE is not excluded, these may be subacute to chronic. Scattered peripheral ground-glass infiltrates within both lungs, greatest in   the upper lungs. These may relate to active interstitial fibrosis. However,   infectious etiologies should be considered as well, especially atypical   etiologies. Findings were discussed with Margaret Sharma at 3:50 pm on 3/12/2023. XR CHEST PORTABLE   Final Result   1.  No acute radiographic finding to account for patient's shortness of breath. 2. Questionable nodular density in the right mid lung. Consider CT chest for   further evaluation. Consults:     IP CONSULT TO HOSPITALIST  IP CONSULT TO NEPHROLOGY  IP CONSULT TO PULMONOLOGY    Disposition:  home     Condition at Discharge: Stable    Discharge Instructions/Follow-up:  Follow up with PCP, pulmonology, endocrinology within 1-2 weeks    Code Status:  Full Code     Activity: activity as tolerated    Diet: regular diet      Discharge Medications:     Current Discharge Medication List             Details   apixaban starter pack (ELIQUIS DVT/PE STARTER PACK) 5 MG TBPK tablet Take 1 tablet by mouth See Admin Instructions  Qty: 74 tablet, Refills: 0      levoFLOXacin (LEVAQUIN) 250 MG tablet Take 1 tablet by mouth daily for 6 doses  Qty: 6 tablet, Refills: 0                Details   furosemide (LASIX) 20 MG tablet Take 20 mg by mouth daily. solifenacin (VESICARE) 10 MG tablet Take 10 mg by mouth daily. FIBER PO Take  by mouth. Probiotic Product (PROBIOTIC DAILY PO) Take  by mouth. celecoxib (CELEBREX) 200 MG capsule Take 200 mg by mouth 2 times daily. Naphazoline-Pheniramine (EYE ALLERGY RELIEF OP) Apply  to eye. omeprazole (PRILOSEC) 20 MG capsule Take 2 capsules by mouth 2 times daily. Qty: 30 capsule, Refills: 3      Fluticasone-Salmeterol (ADVAIR HFA IN) Inhale  into the lungs. levothyroxine (SYNTHROID) 50 MCG tablet Take 50 mcg by mouth daily. Carboxymethylcellulose Sodium (REFRESH OP) Apply  to eye. Time Spent on discharge: 37 minutes in the examination, evaluation, counseling and review of medications and discharge plan. Signed:    Felicita Kowalski MD   3/15/2023      Thank you Ja Williamson MD for the opportunity to be involved in this patient's care. If you have any questions or concerns, please feel free to contact me at [phone].

## 2023-03-15 NOTE — PROGRESS NOTES
Interval History and plan:      It appears per Dr Tiffanie Rousseau notes seen by Dr Alexandre Vergara, endocrinologist   But couldn't find the chart    She has significant hyperglycemia    Better than yesterday  In the past her PTH was very high, serum calcium Urine was low    PTH can be high in 10% of the [address]    PTH high    Plan:  She appears to have primary hyperparathyroidism given very high calcium and also high PTH  Some patients with hyperparathyroidism can have low calcium in the urine  Spoke to daughter Daisha Jaramillo in detail who said that she was at point diagnosed to have [address]    However explained that that diagnosis may need to be reevaluated  She is definitely not a very good candidate for surgery at this point but at least she may benefit from being on Sensipar  We will let her primary care physician know and she will need follow-up with endocrinologist    Her creatinine higher than baseline  GFR better than yesterday though  Suspect she has contrast nephropathy from which she is on her recovery phase                       Assessment :     Hypercalcemia  Calcium 12.1 at the time of consult  Albumin is on the high side at 4.9 on consult     Acidosis  20 CO2 on  consult  Anion gap 17  Lactic acidosis low at high at 3.5  Lactic acidosis likely due to hypoxia      Idiopathic lung fibrosis  Acute PE  Bilateral pneumonia      [facility] would like to thank Madeleine Damon MD   for opportunity to serve this patient      Please call with questions at-   24 Hrs Answering service [phone] or  7 am- 5 pm via Perfect serve or cell phone  Ruby Mooney MD          CC/reason for consult :     Hypercalcemia     HPI :     Sawyer Escamilla is a 80 y.o. female presented to   the hospital on 3/12/2023 with known pulmonary fibrosis. Came to the hospital with increased shortness of breath and cough. She had a drop in oxygen saturation at home. Also complaining of swelling of the leg chronically.   She came to the ED when she was found to have PE per CT scan. Also possible atypical bacterial infection. On presentation her lactate was 2.4  Calcium was 11.2. She is getting IV antibiotics. Getting heparin drip    In the meantime we are consulted for Hypercalcemia and related issues  ROS:     Seen with- family    positives in bold   Constitutional:  fever, chills, weakness, weight change, fatigue  Skin:  rash, pruritus, hair loss, bruising, dry skin, petechiae  Head, Face, Neck   headaches, swelling,  cervical adenopathy  Respiratory: shortness of breath, cough, or wheezing  Cardiovascular: chest pain, palpitations, dizzy, edema  Gastrointestinal: nausea, vomiting, diarrhea, constipation,belly pain    Yellow skin, blood in stool  Musculoskeletal:  back pain, muscle weakness, gait problems,       joint pain or swelling. Genitourinary:  dysuria, poor urine flow, flank pain, blood in urine  Neurologic:  vertigo, TIA'S, syncope, seizures, focal weakness  Psychosocial:  insomnia, anxiety, or depression. Additional positive findings:                    All other remaining systems are negative or unable to obtain        PMH/PSH/SH/Family History:     Past Medical History:   Diagnosis Date    Allergic rhinitis, mild     Arthritis     Asthma     Bloating     Cancer (HCC)     skin    Swallowing difficulty     Thyroid disease        Past Surgical History:   Procedure Laterality Date    CHOLECYSTECTOMY      COLONOSCOPY  7/24/14    colon Bx,s/ esoph Bx    HYSTERECTOMY (CERVIX STATUS UNKNOWN)      TUBAL LIGATION      UPPER GASTROINTESTINAL ENDOSCOPY  5/22/12    bx    WISDOM TOOTH EXTRACTION          reports that she quit smoking about 54 years ago. She has never used smokeless tobacco. She reports current alcohol use. She reports that she does not use drugs. family history includes Heart Disease in her father and mother.          Medication:     Current Facility-Administered Medications: levoFLOXacin (LEVAQUIN) tablet 250 mg, 250 mg, Oral, Daily  apixaban (ELIQUIS) tablet 10 mg, 10 mg, Oral, BID **FOLLOWED BY** [START ON 3/22/2023] apixaban (ELIQUIS) tablet 5 mg, 5 mg, Oral, BID  lactobacillus (CULTURELLE) capsule 1 capsule, 1 capsule, Oral, Daily with breakfast  ipratropium-albuterol (DUONEB) nebulizer solution 1 ampule, 1 ampule, Inhalation, Q4H PRN  benzocaine-menthol (CEPACOL SORE THROAT) lozenge 1 lozenge, 1 lozenge, Oral, Q2H PRN  glucose chewable tablet 16 g, 4 tablet, Oral, PRN  dextrose bolus 10% 125 mL, 125 mL, IntraVENous, PRN **OR** dextrose bolus 10% 250 mL, 250 mL, IntraVENous, PRN  glucagon (rDNA) injection 1 mg, 1 mg, SubCUTAneous, PRN  dextrose 10 % infusion, , IntraVENous, Continuous PRN  carboxymethylcellulose PF (THERATEARS) 1 % ophthalmic gel 2 drop, 2 drop, Both Eyes, PRN  pantoprazole (PROTONIX) tablet 40 mg, 40 mg, Oral, BID AC  levothyroxine (SYNTHROID) tablet 50 mcg, 50 mcg, Oral, Daily  sodium chloride flush 0.9 % injection 5-40 mL, 5-40 mL, IntraVENous, 2 times per day  sodium chloride flush 0.9 % injection 5-40 mL, 5-40 mL, IntraVENous, PRN  0.9 % sodium chloride infusion, , IntraVENous, PRN  ondansetron (ZOFRAN-ODT) disintegrating tablet 4 mg, 4 mg, Oral, Q8H PRN **OR** ondansetron (ZOFRAN) injection 4 mg, 4 mg, IntraVENous, Q6H PRN  polyethylene glycol (GLYCOLAX) packet 17 g, 17 g, Oral, Daily PRN  acetaminophen (TYLENOL) tablet 650 mg, 650 mg, Oral, Q6H PRN **OR** acetaminophen (TYLENOL) suppository 650 mg, 650 mg, Rectal, Q6H PRN  potassium chloride (KLOR-CON M) extended release tablet 40 mEq, 40 mEq, Oral, PRN **OR** potassium bicarb-citric acid (EFFER-K) effervescent tablet 40 mEq, 40 mEq, Oral, PRN **OR** potassium chloride 10 mEq/100 mL IVPB (Peripheral Line), 10 mEq, IntraVENous, PRN  magnesium sulfate 2000 mg in 50 mL IVPB premix, 2,000 mg, IntraVENous, PRN  heparin (porcine) injection 5,000 Units, 5,000 Units, IntraVENous, PRN  heparin (porcine) injection 2,500 Units, 2,500 Units, IntraVENous, PRN  perflutren lipid microspheres (DEFINITY) injection 1.5 mL, 1.5 mL, IntraVENous, ONCE PRN  mometasone-formoterol (DULERA) 200-5 MCG/ACT inhaler 2 puff, 2 puff, Inhalation, BID       Vitals :     Vitals:    03/15/23 1157   BP: 135/67   Pulse: 90   Resp: 18   Temp: 97.7 °F (36.5 °C)   SpO2: 90%       I & O :     No intake or output data in the 24 hours ending 03/15/23 1526       Physical Examination :     General appearance: Alert oriented very pleasant   Respiratory: Comfortable on oxygen  Cardiovascular: NAD, Edema-  Abdomen: -Soft nontender no distention  Other relevant findings:-       LABS:     Recent Labs     03/13/23  0632 03/14/23  0632 03/15/23  0602   WBC 17.0* 16.0* 9.4   HGB 14.7 13.4 14.0   HCT 45.4 40.8 42.3    342 334       Recent Labs     03/13/23  0632 03/14/23  0632 03/15/23  0602    140 140   K 4.6 4.4 4.0    103 105   CO2 20* 26 24   BUN 13 20 21*   CREATININE 0.9 1.1 1.0   GLUCOSE 113* 85 98   MG  --  1.80  --           Thanks,   [facility]  [address] [address] [address] [facility]  Office: [phone]  Fax: [phone] no

## 2023-03-16 LAB
BACTERIA BLD CULT ORG #2: NORMAL
BACTERIA BLD CULT: NORMAL

## 2023-03-18 LAB — PTH RELATED PROT SERPL-SCNC: 2.3 PMOL/L (ref 0–3.4)

## 2025-01-18 ENCOUNTER — APPOINTMENT (OUTPATIENT)
Dept: GENERAL RADIOLOGY | Age: 85
End: 2025-01-18
Payer: MEDICARE

## 2025-01-18 ENCOUNTER — APPOINTMENT (OUTPATIENT)
Dept: CT IMAGING | Age: 85
End: 2025-01-18
Payer: MEDICARE

## 2025-01-18 ENCOUNTER — HOSPITAL ENCOUNTER (EMERGENCY)
Age: 85
Discharge: HOME | End: 2025-01-18
Payer: MEDICARE

## 2025-01-18 VITALS
HEART RATE: 85 BPM | OXYGEN SATURATION: 100 % | BODY MASS INDEX: 41.5 KG/M2 | WEIGHT: 219.8 LBS | RESPIRATION RATE: 24 BRPM | SYSTOLIC BLOOD PRESSURE: 149 MMHG | HEIGHT: 61 IN | TEMPERATURE: 98.9 F | DIASTOLIC BLOOD PRESSURE: 89 MMHG

## 2025-01-18 DIAGNOSIS — J10.1 INFLUENZA A: Primary | ICD-10-CM

## 2025-01-18 DIAGNOSIS — J84.10 PULMONARY FIBROSIS (HCC): ICD-10-CM

## 2025-01-18 DIAGNOSIS — R06.02 SHORTNESS OF BREATH: ICD-10-CM

## 2025-01-18 LAB
ALBUMIN SERPL-MCNC: 4.3 G/DL (ref 3.4–5)
ALBUMIN/GLOB SERPL: 1.3 {RATIO} (ref 1.1–2.2)
ALP SERPL-CCNC: 54 U/L (ref 40–129)
ALT SERPL-CCNC: 40 U/L (ref 10–40)
ANION GAP SERPL CALCULATED.3IONS-SCNC: 12 MMOL/L (ref 3–16)
AST SERPL-CCNC: 47 U/L (ref 15–37)
BASOPHILS # BLD: 0.1 K/UL (ref 0–0.2)
BASOPHILS NFR BLD: 0.8 %
BILIRUB SERPL-MCNC: 0.3 MG/DL (ref 0–1)
BILIRUB UR QL STRIP.AUTO: NEGATIVE
BUN SERPL-MCNC: 11 MG/DL (ref 7–20)
CALCIUM SERPL-MCNC: 9.8 MG/DL (ref 8.3–10.6)
CHLORIDE SERPL-SCNC: 98 MMOL/L (ref 99–110)
CLARITY UR: CLEAR
CO2 SERPL-SCNC: 25 MMOL/L (ref 21–32)
COLOR UR: YELLOW
CREAT SERPL-MCNC: 0.9 MG/DL (ref 0.6–1.2)
DEPRECATED RDW RBC AUTO: 14.4 % (ref 12.4–15.4)
EKG ATRIAL RATE: 92 BPM
EKG DIAGNOSIS: NORMAL
EKG P AXIS: 56 DEGREES
EKG P-R INTERVAL: 146 MS
EKG Q-T INTERVAL: 322 MS
EKG QRS DURATION: 82 MS
EKG QTC CALCULATION (BAZETT): 398 MS
EKG R AXIS: -20 DEGREES
EKG T AXIS: 44 DEGREES
EKG VENTRICULAR RATE: 92 BPM
EOSINOPHIL # BLD: 0 K/UL (ref 0–0.6)
EOSINOPHIL NFR BLD: 0.3 %
FLUAV RNA RESP QL NAA+PROBE: DETECTED
FLUBV RNA RESP QL NAA+PROBE: NOT DETECTED
GFR SERPLBLD CREATININE-BSD FMLA CKD-EPI: 63 ML/MIN/{1.73_M2}
GLUCOSE SERPL-MCNC: 108 MG/DL (ref 70–99)
GLUCOSE UR STRIP.AUTO-MCNC: NEGATIVE MG/DL
HCT VFR BLD AUTO: 41.3 % (ref 36–48)
HGB BLD-MCNC: 13.9 G/DL (ref 12–16)
HGB UR QL STRIP.AUTO: NEGATIVE
KETONES UR STRIP.AUTO-MCNC: ABNORMAL MG/DL
LEUKOCYTE ESTERASE UR QL STRIP.AUTO: NEGATIVE
LYMPHOCYTES # BLD: 1.1 K/UL (ref 1–5.1)
LYMPHOCYTES NFR BLD: 17.4 %
MCH RBC QN AUTO: 31.7 PG (ref 26–34)
MCHC RBC AUTO-ENTMCNC: 33.6 G/DL (ref 31–36)
MCV RBC AUTO: 94.4 FL (ref 80–100)
MONOCYTES # BLD: 1 K/UL (ref 0–1.3)
MONOCYTES NFR BLD: 15.5 %
NEUTROPHILS # BLD: 4.2 K/UL (ref 1.7–7.7)
NEUTROPHILS NFR BLD: 66 %
NITRITE UR QL STRIP.AUTO: NEGATIVE
NT-PROBNP SERPL-MCNC: 367 PG/ML (ref 0–449)
PH UR STRIP.AUTO: 5.5 [PH] (ref 5–8)
PLATELET # BLD AUTO: 327 K/UL (ref 135–450)
PMV BLD AUTO: 7.7 FL (ref 5–10.5)
POTASSIUM SERPL-SCNC: 3.9 MMOL/L (ref 3.5–5.1)
PROT SERPL-MCNC: 7.6 G/DL (ref 6.4–8.2)
PROT UR STRIP.AUTO-MCNC: NEGATIVE MG/DL
RBC # BLD AUTO: 4.38 M/UL (ref 4–5.2)
SARS-COV-2 RNA RESP QL NAA+PROBE: NOT DETECTED
SODIUM SERPL-SCNC: 135 MMOL/L (ref 136–145)
SP GR UR STRIP.AUTO: 1.01 (ref 1–1.03)
TROPONIN, HIGH SENSITIVITY: 21 NG/L (ref 0–14)
TROPONIN, HIGH SENSITIVITY: 23 NG/L (ref 0–14)
UA COMPLETE W REFLEX CULTURE PNL UR: ABNORMAL
UA DIPSTICK W REFLEX MICRO PNL UR: ABNORMAL
URN SPEC COLLECT METH UR: ABNORMAL
UROBILINOGEN UR STRIP-ACNC: 1 E.U./DL
WBC # BLD AUTO: 6.3 K/UL (ref 4–11)

## 2025-01-18 PROCEDURE — 87636 SARSCOV2 & INF A&B AMP PRB: CPT

## 2025-01-18 PROCEDURE — 83880 ASSAY OF NATRIURETIC PEPTIDE: CPT

## 2025-01-18 PROCEDURE — 80053 COMPREHEN METABOLIC PANEL: CPT

## 2025-01-18 PROCEDURE — 84484 ASSAY OF TROPONIN QUANT: CPT

## 2025-01-18 PROCEDURE — 6360000004 HC RX CONTRAST MEDICATION: Performed by: PHYSICIAN ASSISTANT

## 2025-01-18 PROCEDURE — 93010 ELECTROCARDIOGRAM REPORT: CPT | Performed by: INTERNAL MEDICINE

## 2025-01-18 PROCEDURE — 87186 SC STD MICRODIL/AGAR DIL: CPT

## 2025-01-18 PROCEDURE — 93005 ELECTROCARDIOGRAM TRACING: CPT | Performed by: PHYSICIAN ASSISTANT

## 2025-01-18 PROCEDURE — 87086 URINE CULTURE/COLONY COUNT: CPT

## 2025-01-18 PROCEDURE — 71260 CT THORAX DX C+: CPT

## 2025-01-18 PROCEDURE — 87088 URINE BACTERIA CULTURE: CPT

## 2025-01-18 PROCEDURE — 85025 COMPLETE CBC W/AUTO DIFF WBC: CPT

## 2025-01-18 PROCEDURE — 71046 X-RAY EXAM CHEST 2 VIEWS: CPT

## 2025-01-18 PROCEDURE — 99285 EMERGENCY DEPT VISIT HI MDM: CPT

## 2025-01-18 PROCEDURE — 81003 URINALYSIS AUTO W/O SCOPE: CPT

## 2025-01-18 PROCEDURE — 36415 COLL VENOUS BLD VENIPUNCTURE: CPT

## 2025-01-18 RX ORDER — IOPAMIDOL 755 MG/ML
75 INJECTION, SOLUTION INTRAVASCULAR
Status: COMPLETED | OUTPATIENT
Start: 2025-01-18 | End: 2025-01-18

## 2025-01-18 RX ORDER — BENZONATATE 100 MG/1
100 CAPSULE ORAL 3 TIMES DAILY PRN
Qty: 30 CAPSULE | Refills: 0 | Status: SHIPPED | OUTPATIENT
Start: 2025-01-18 | End: 2025-01-28

## 2025-01-18 RX ADMIN — IOPAMIDOL 75 ML: 755 INJECTION, SOLUTION INTRAVENOUS at 12:50

## 2025-01-18 ASSESSMENT — PAIN - FUNCTIONAL ASSESSMENT
PAIN_FUNCTIONAL_ASSESSMENT: 0-10
PAIN_FUNCTIONAL_ASSESSMENT: 0-10

## 2025-01-18 ASSESSMENT — PAIN SCALES - GENERAL: PAINLEVEL_OUTOF10: 7

## 2025-01-18 NOTE — ED PROVIDER NOTES
limits   TROPONIN - Abnormal; Notable for the following components:    Troponin, High Sensitivity 23 (*)     All other components within normal limits   CBC WITH AUTO DIFFERENTIAL   BRAIN NATRIURETIC PEPTIDE       When ordered only abnormal lab results are displayed. All other labs were within normal range or not returned as of this dictation.    EKG: When ordered, EKG's are interpreted by the Emergency Department Physician in the absence of a cardiologist.  Please see their note for interpretation of EKG.    RADIOLOGY:   Non-plain film images such as CT, Ultrasound and MRI are read by the radiologist. Plain radiographic images are visualized and preliminarily interpreted by the ED Provider with the below findings:        Interpretation per the Radiologist below, if available at the time of this note:    CT CHEST PULMONARY EMBOLISM W CONTRAST   Final Result      1. No evidence of any pulmonary thromboembolus, aneurysm or dissection.       2. Patchy groundglass airspace disease noted , most apparent in the right upper lobe without segmental consolidation. Bronchial thickening also noted. Correlate for chronic versus acute disease.   3. No sign of any adenopathy or parenchymal spiculated mass.      ----------PERT DATA----------      Data reported only if pulmonary embolism is present:      Most central extent of clot:   NA      RV/LV ratio:   NA      ----------PERT DATA----------      Electronically signed by Alonzo Oconnor DO      XR CHEST (2 VW)   Final Result      New patchy airspace disease noted in the right lung base. Accentuated perihilar markings noted      Electronically signed by Alonzo Oconnor DO        CT CHEST PULMONARY EMBOLISM W CONTRAST    Result Date: 1/18/2025  CT PULMONARY ANGIOGRAPHY OF THE CHEST on 1/18/2025 History: Chest pain, shortness of breath history of pulmonary fibrosis PROCEDURE: Dose modulation, radiation reducing techniques utilized to reduce radiation exposure with  Accentuated perihilar markings noted Electronically signed by Alonzo Oconnor, DO      No results found.    PROCEDURES   Unless otherwise noted below, none     Procedures    CRITICAL CARE TIME (.cctime)   None    PAST MEDICAL HISTORY      has a past medical history of Allergic rhinitis, mild, Arthritis, Asthma, Bloating, Cancer (HCC), Pulmonary fibrosis (HCC), Swallowing difficulty, and Thyroid disease.     Chronic Conditions affecting Care: Pulmonary fibrosis    EMERGENCY DEPARTMENT COURSE and DIFFERENTIAL DIAGNOSIS/MDM:   Vitals:    Vitals:    01/18/25 1223 01/18/25 1351 01/18/25 1358 01/18/25 1429   BP: (!) 153/78 116/60 (!) 164/79 (!) 158/91   Pulse: 77 87 90 76   Resp: 26 23 22 27   Temp:       TempSrc:       SpO2: 100% 100% 98% 98%   Weight:       Height:           Patient was given the following medications:  Medications   iopamidol (ISOVUE-370) 76 % injection 75 mL (75 mLs IntraVENous Given 1/18/25 1250)             Is this patient to be included in the SEP-1 Core Measure due to severe sepsis or septic shock?   No   Exclusion criteria - the patient is NOT to be included for SEP-1 Core Measure due to:  Viral etiology found or highly suspected (including COVID-19) without concomitant bacterial infection    CONSULTS: (Who and What was discussed)  None      Social Determinants : None    Records Reviewed : None    CC/HPI Summary, DDx, ED Course, and Reassessment: Patient was evaluated in the emergency department today for shortness of breath.  Her vital signs are stable at triage.  Physical exam is overall reassuring.      Workup in the emergency department includes EKG which was interpreted by attending physician found abnormal sinus rhythm.    Chest x-ray shows new patchy airspace disease in the right lung base  CBC without evidence of leukocytosis or acute anemia  CMP with sodium 135, chloride 98.  Renal function maintained.  No transaminitis.  Initial high sensitive troponin is 23 and repeat is stable at

## 2025-01-18 NOTE — ED NOTES
Ok for d/c. Stable, by wheelchair. Edu pt and family bedside re:symptom management at home and f/u w/ PCP. Both verbalized understanding. Pt left w/ son and all personal belongings and home O2 at 3L.

## 2025-01-19 LAB
BACTERIA UR CULT: ABNORMAL
ORGANISM: ABNORMAL

## 2025-01-20 LAB
BACTERIA UR CULT: ABNORMAL
ORGANISM: ABNORMAL